# Patient Record
Sex: FEMALE | Race: WHITE | Employment: OTHER | URBAN - METROPOLITAN AREA
[De-identification: names, ages, dates, MRNs, and addresses within clinical notes are randomized per-mention and may not be internally consistent; named-entity substitution may affect disease eponyms.]

---

## 2017-04-24 ENCOUNTER — HOSPITAL ENCOUNTER (EMERGENCY)
Facility: CLINIC | Age: 72
Discharge: HOME | End: 2017-04-24
Attending: PHYSICIAN ASSISTANT | Admitting: PHYSICIAN ASSISTANT
Payer: MEDICARE

## 2017-04-24 VITALS
OXYGEN SATURATION: 95 % | TEMPERATURE: 97.8 F | RESPIRATION RATE: 20 BRPM | DIASTOLIC BLOOD PRESSURE: 82 MMHG | HEART RATE: 71 BPM | SYSTOLIC BLOOD PRESSURE: 134 MMHG

## 2017-04-24 DIAGNOSIS — J01.90 ACUTE SINUSITIS WITH SYMPTOMS > 10 DAYS: ICD-10-CM

## 2017-04-24 PROCEDURE — 99213 OFFICE O/P EST LOW 20 MIN: CPT

## 2017-04-24 PROCEDURE — 99213 OFFICE O/P EST LOW 20 MIN: CPT | Performed by: PHYSICIAN ASSISTANT

## 2017-04-24 RX ORDER — FLUTICASONE PROPIONATE 50 MCG
1-2 SPRAY, SUSPENSION (ML) NASAL DAILY PRN
Qty: 1 BOTTLE | Refills: 0 | Status: SHIPPED | OUTPATIENT
Start: 2017-04-24 | End: 2017-11-16

## 2017-04-24 NOTE — ED AVS SNAPSHOT
[facility] Emergency Department    [address] [facility] [facility] [address]    Phone:  [phone]    Fax:  [phone]                                       Seema Barnes   MRN: [id number]    Department:  [facility] Emergency Department   Date of Visit:  4/24/2017           Patient Information     Date Of Birth          1945        Your diagnoses for this visit were:     Acute sinusitis with symptoms > 10 days        You were seen by Yue Messina PA-C.      Follow-up Information     Please follow up.    Why:  As needed, If symptoms worsen        Discharge Instructions       Use Medication as directed    Patient informed to rest, warm compresses over sinuses as needed, stay hydrated, cool humidifier, salt water gargles, and nasal saline sprays as needed.  Lots of rest and fluids.  Tylenol or ibuprofen as needed.  Nasal saline sprays as needed    Return if any worsening symptoms or if not improving.    Go to Emergency Room if sx worsen or change, worst headache of life, visual changes, confusion, fevers > 104F occur.     Patient voiced understanding of instructions given.       24 Hour Appointment Hotline       To make an appointment at any [facility], call [phone] ([phone]). If you don't have a family doctor or clinic, we will help you find one. [facility] clinics are conveniently located to serve the needs of you and your family.             Review of your medicines      START taking        Dose / Directions Last dose taken    amoxicillin-clavulanate 875-125 MG per tablet   Commonly known as:  AUGMENTIN   Dose:  1 tablet   Quantity:  20 tablet        Take 1 tablet by mouth 2 times daily for 10 days   Refills:  0        fluticasone 50 MCG/ACT spray   Commonly known as:  FLONASE   Dose:  1-2 spray   Quantity:  1 Bottle        Spray 1-2 sprays into both nostrils daily as needed for rhinitis or allergies   Refills:  0          Our records show that you are taking the  medicines listed below. If these are incorrect, please call your family doctor or clinic.        Dose / Directions Last dose taken    atenolol 50 MG tablet   Commonly known as:  TENORMIN   Dose:  50 mg   Quantity:  90 tablet        Take 1 tablet (50 mg) by mouth daily   Refills:  4        EPINEPHrine 0.3 MG/0.3ML injection   Commonly known as:  EPIPEN   Dose:  0.3 mg   Quantity:  1 each        Inject 0.3 mLs (0.3 mg) into the muscle once as needed for anaphylaxis   Refills:  PRN        simvastatin 20 MG tablet   Commonly known as:  ZOCOR   Dose:  20 mg   Quantity:  90 tablet        Take 1 tablet (20 mg) by mouth At Bedtime at bedtime.   Refills:  4                Prescriptions were sent or printed at these locations (2 Prescriptions)                   [facility] [address] [place] [address]   [address] [facility] [address]    Telephone:  [phone]   Fax:  [phone]   Hours:                  E-Prescribed (2 of 2)         amoxicillin-clavulanate (AUGMENTIN) 875-125 MG per tablet               fluticasone (FLONASE) 50 MCG/ACT spray                Orders Needing Specimen Collection     None      Pending Results     No orders found from 4/22/2017 to 4/25/2017.            Pending Culture Results     No orders found from 4/22/2017 to 4/25/2017.            Test Results From Your Hospital Stay               Thank you for choosing [facility]       Thank you for choosing [facility] for your care. Our goal is always to provide you with excellent care. Hearing back from our patients is one way we can continue to improve our services. Please take a few minutes to complete the written survey that you may receive in the mail after you visit with us. Thank you!        [employer] Information     [employer]mi gives you secure access to your electronic health record. If you see a primary care provider, you can also send messages to your care team and make appointments. If you have questions, please call your  primary care clinic.  If you do not have a primary care provider, please call [phone] and they will assist you.        Care EveryWhere ID     This is your Care EveryWhere ID. This could be used by other organizations to access your [place] medical records  [id number]        After Visit Summary       This is your record. Keep this with you and show to your community pharmacist(s) and doctor(s) at your next visit.

## 2017-04-24 NOTE — ED AVS SNAPSHOT
[facility] Emergency Department    [address] [facility] [address]    Phone:  [phone]    Fax:  [phone]                                       Seema Barnes   MRN: [id number]    Department:  [facility] Emergency Department   Date of Visit:  4/24/2017           After Visit Summary Signature Page     I have received my discharge instructions, and my questions have been answered. I have discussed any challenges I see with this plan with the nurse or doctor.    ..........................................................................................................................................  Patient/Patient Representative Signature      ..........................................................................................................................................  Patient Representative Print Name and Relationship to Patient    ..................................................               ................................................  Date                                            Time    ..........................................................................................................................................  Reviewed by Signature/Title    ...................................................              ..............................................  Date                                                            Time

## 2017-04-24 NOTE — ED PROVIDER NOTES
History   No chief complaint on file.    HPI  Seema Barnes is a 72 year old female who   OBJECTIVE:                                                      ENT Symptoms             Symptoms: cc Present Absent Comment   Fever/Chills   x    Fatigue   x    Muscle Aches   x    Eye Irritation  x  Slightly dry eyes    Sneezing  x  occasionally   Nasal Dany/Drg  x  Right maxillary sinus   Sinus Pressure/Pain  x  Right maxillary sinus   Loss of smell   x    Dental pain   x    Sore Throat   x    Swollen Glands   x    Ear Pain/Fullness   x    Cough   x    Wheeze   x    Chest Pain   x    Shortness of breath   x    Rash   x    Other   x  Slight headache on right side over the frontal sinus.   Patient has been dealing with URI symptoms for the past 3 weeks and over the past few days     Symptom duration:  3 weeks.    Symptom severity:  mild   Treatments tried:  over the counter decongestants.    Contacts:  none         Problem list, Medication list, Allergies, and Medical/Social/Surgical histories reviewed in Psychiatric and updated as appropriate.    Review of Systems     All normal unless stated above     Physical Exam   BP: 134/82  Pulse: 71  Temp: 97.8  F (36.6  C)  Resp: 20  SpO2: 95 %  Physical Exam     Constitutional: healthy, alert and no distress   Cardiovascular:  RRR. No murmurs, clicks gallops or rub  Respiratory: Lungs clear to auscultation bilaterally. No rales, rhonchi, wheezing appreciated. No accessory muscle use or retractions.   Neck: Neck supple. No adenopathy. Thyroid symmetric, normal size,  ENT: bilateral TM normal without fluid or infection, neck without nodes, throat normal without erythema or exudate, right maxillary sinus tender and post nasal drip noted  SKIN: no suspicious lesions or rashes    No results found for this or any previous visit (from the past 24 hour(s)).        ED Course     ED Course     Procedures            Critical Care time:  none               Labs Ordered and Resulted from Time of ED  Arrival Up to the Time of Departure from the ED - No data to display    Assessments & Plan (with Medical Decision Making)     I have reviewed the nursing notes.    I have reviewed the findings, diagnosis, plan and need for follow up with the patient.    Discharge Medication List as of 4/24/2017 12:38 PM      START taking these medications    Details   amoxicillin-clavulanate (AUGMENTIN) 875-125 MG per tablet Take 1 tablet by mouth 2 times daily for 10 days, Disp-20 tablet, R-0, E-Prescribe      fluticasone (FLONASE) 50 MCG/ACT spray Spray 1-2 sprays into both nostrils daily as needed for rhinitis or allergies, Disp-1 Bottle, R-0, E-Prescribe             Final diagnoses:   Acute sinusitis with symptoms > 10 days       4/24/2017   [facility] EMERGENCY DEPARTMENT     Yue Messina PA-C  04/24/17 1313

## 2017-09-26 ENCOUNTER — RADIANT APPOINTMENT (OUTPATIENT)
Dept: GENERAL RADIOLOGY | Facility: CLINIC | Age: 72
End: 2017-09-26
Attending: FAMILY MEDICINE
Payer: COMMERCIAL

## 2017-09-26 ENCOUNTER — OFFICE VISIT (OUTPATIENT)
Dept: ORTHOPEDICS | Facility: CLINIC | Age: 72
End: 2017-09-26
Payer: COMMERCIAL

## 2017-09-26 VITALS
BODY MASS INDEX: 25.76 KG/M2 | WEIGHT: 170 LBS | HEIGHT: 68 IN | DIASTOLIC BLOOD PRESSURE: 76 MMHG | SYSTOLIC BLOOD PRESSURE: 113 MMHG

## 2017-09-26 DIAGNOSIS — M70.61 TROCHANTERIC BURSITIS OF RIGHT HIP: ICD-10-CM

## 2017-09-26 DIAGNOSIS — M25.551 RIGHT HIP PAIN: Primary | ICD-10-CM

## 2017-09-26 DIAGNOSIS — M25.551 RIGHT HIP PAIN: ICD-10-CM

## 2017-09-26 PROCEDURE — 73502 X-RAY EXAM HIP UNI 2-3 VIEWS: CPT

## 2017-09-26 PROCEDURE — 99203 OFFICE O/P NEW LOW 30 MIN: CPT | Performed by: FAMILY MEDICINE

## 2017-09-26 NOTE — NURSING NOTE
"Chief Complaint   Patient presents with     Musculoskeletal Problem     right lateral hip pain > 2 months       Initial /76  Ht 5' 7.5\" (1.715 m)  Wt 170 lb (77.1 kg)  BMI 26.23 kg/m2 Estimated body mass index is 26.23 kg/(m^2) as calculated from the following:    Height as of this encounter: 5' 7.5\" (1.715 m).    Weight as of this encounter: 170 lb (77.1 kg).  Medication Reconciliation: complete     Edward Cotter ATC  "

## 2017-09-26 NOTE — PROGRESS NOTES
"Seema Barnes  :  1945  DOS: 2017  MRN: [id number]    Sports Medicine Clinic Visit    PCP: Cande Lao    Seema Barnes is a 72 year old female who is seen as a self referral presenting with right lateral hip pain.    Injury: Gradual onset of right hip pain over the last ~ 2 months after doing increased amount of stairs while on vacation.  Tried resting from walking for ~ 2 weeks with moderate relief - pain returned after resuming walking regime.  Pain located over right lateral hip, nonradiating.  Reports intermittent radiating, pain to lateral thigh.  Additional Features:  Positive: weakness.  Symptoms are better with Rest.  Symptoms are worse with: prolonged walking, going from sit to stand, lying on right side.  Other evaluation and/or treatments so far consists of: Ibuprofen and Rest.  Recent imaging completed: No recent imaging completed.  Prior History of related problems: none    Social History: retired    Review of Systems  Musculoskeletal: as above  Remainder of review of systems is negative including constitutional, CV, pulmonary, GI, Skin and Neurologic except as noted in HPI or medical history.    Past Medical History:   Diagnosis Date     MEDICAL HISTORY OF -     migraines - none since      Pure hypercholesterolemia      Unspecified essential hypertension      Past Surgical History:   Procedure Laterality Date     HC REMOVAL OF TONSILS,<9 Y/O      Tonsils <10y.o.     SURGICAL HISTORY OF -       tubal ligation       Objective  /76  Ht 5' 7.5\" (1.715 m)  Wt 170 lb (77.1 kg)  BMI 26.23 kg/m2    General: healthy, alert and in no distress    HEENT: no scleral icterus or conjunctival erythema   Skin: no suspicious lesions or rash. No jaundice.   CV: regular rhythm by palpation, 2+ distal pulses, no pedal edema    Resp: normal respiratory effort without conversational dyspnea   Psych: normal mood and affect    Gait: nonantalgic, appropriate coordination and balance "   Neuro: normal light touch sensory exam of the extremities. Motor strength as noted below       Right hip exam    Inspection:        no edema or ecchymosis in hip area    ROM:       Full active and passive ROM       painful adduction and ER, mild and localized over lateral hip    Strength:        flexion 5/5       extension 5/5       abduction 5/5       adduction 4/5 due to pain    Tender:        greater trochanter       SI joint but chronic, known and unrelated    Non Tender:        remainder of hip area       illiac crest       ASIS       pubis    Sensation:        grossly intact in hip and thigh    Skin:       well perfused       capillary refill brisk    Special Tests:        neg (-) JOSE ENRIQUE       neg (-) FADIR       neg (-) scour       positive (+) Elle    Radiology  XR images independently visualized and reviewed with patient today in clinic  No significant hip or lumbar degenerative change appreciated, joint spaces well-maintained, no acute findings, will follow final radiology read    Assessment:  1. Right hip pain    2. Trochanteric bursitis of right hip        Plan:  Discussed the assessment with the patient.  Follow up: 6-8 weeks prn  Signs of gluteal tendinitis with mild bursitis/ITB sx  HEP and PT options reviewed  Order can be placed anytime for PT if needed/desired  Low impact activity options and strategies reviewed  HEP provided  No significant hip joint or lumbar signs on exam  Use of short 1-2 wk course of NSAIDs reviewed, dosing and precautions reviewed if utilized  We discussed modified progressive pain-free activity as tolerated  Home handouts provided and supportive care reviewed  All questions were answered today  Contact us with additional questions or concerns  Signs and sx of concern reviewed      Oswaldo Holley DO, CAQ  Primary Care Sports Medicine  [facility]         Disclaimer: This note consists of symbols derived from keyboarding, dictation and/or voice  recognition software. As a result, there may be errors in the script that have gone undetected. Please consider this when interpreting information found in this chart.

## 2017-09-26 NOTE — MR AVS SNAPSHOT
"              After Visit Summary   9/26/2017    Seema Barnes    MRN: [id number]           Patient Information     Date Of Birth          1945        Visit Information        Provider Department      9/26/2017 8:40 AM Oswaldo Holley,  [facility]        Today's Diagnoses     Right hip pain    -  1    Trochanteric bursitis of right hip           Follow-ups after your visit        Who to contact     If you have questions or need follow up information about today's clinic visit or your schedule please contact [facility] directly at [phone].  Normal or non-critical lab and imaging results will be communicated to you by MyChart, letter or phone within 4 business days after the clinic has received the results. If you do not hear from us within 7 days, please contact the clinic through Everypostt or phone. If you have a critical or abnormal lab result, we will notify you by phone as soon as possible.  Submit refill requests through [facility] or call your pharmacy and they will forward the refill request to us. Please allow 3 business days for your refill to be completed.          Additional Information About Your Visit        MyChart Information     [employer] gives you secure access to your electronic health record. If you see a primary care provider, you can also send messages to your care team and make appointments. If you have questions, please call your primary care clinic.  If you do not have a primary care provider, please call [phone] and they will assist you.        Care EveryWhere ID     This is your Care EveryWhere ID. This could be used by other organizations to access your [facility] medical records  [id number]        Your Vitals Were     Height BMI (Body Mass Index)                5' 7.5\" (1.715 m) 26.23 kg/m2           Blood Pressure from Last 3 Encounters:   09/26/17 113/76   04/24/17 134/82   10/18/16 116/89    Weight " from Last 3 Encounters:   09/26/17 170 lb (77.1 kg)   10/18/16 180 lb (81.6 kg)   11/20/15 172 lb (78 kg)               Primary Care Provider Office Phone # Fax #    Cande Mylesvladimir Lao [phone] [address] [facility] [address]        Equal Access to Services     DANIELE LEVI : Hadii aad ku hadasho Soomaali, waaxda luqadaha, qaybta kaalmada adeegyada, waxay idiin hayaan adeeg khlowsh larichardson ah. So [facility] [phone].    ATENCIÓN: Si habla español, tiene a mckeon disposición servicios gratuitos de asistencia lingüística. Fernandoame al [phone].    We comply with applicable federal civil rights laws and [place] laws. We do not discriminate on the basis of race, color, national origin, age, disability sex, sexual orientation or gender identity.            Thank you!     Thank you for choosing [facility]  for your care. Our goal is always to provide you with excellent care. Hearing back from our patients is one way we can continue to improve our services. Please take a few minutes to complete the written survey that you may receive in the mail after your visit with us. Thank you!             Your Updated Medication List - Protect others around you: Learn how to safely use, store and throw away your medicines at www.disposemymeds.org.          This list is accurate as of: 9/26/17  9:16 AM.  Always use your most recent med list.                   Brand Name Dispense Instructions for use Diagnosis    atenolol 50 MG tablet    TENORMIN    90 tablet    Take 1 tablet (50 mg) by mouth daily    Hypertension goal BP (blood pressure) < 130/80       EPINEPHrine 0.3 MG/0.3ML injection 2-pack    EPIPEN    1 each    Inject 0.3 mLs (0.3 mg) into the muscle once as needed for anaphylaxis    Bee sting allergy       fluticasone 50 MCG/ACT spray    FLONASE    1 Bottle    Spray 1-2 sprays into both nostrils daily as needed for rhinitis or allergies        simvastatin 20 MG tablet     ZOCOR    90 tablet    Take 1 tablet (20 mg) by mouth At Bedtime at bedtime.    Hyperlipidemia LDL goal <100

## 2017-11-16 ENCOUNTER — OFFICE VISIT (OUTPATIENT)
Dept: FAMILY MEDICINE | Facility: CLINIC | Age: 72
End: 2017-11-16
Payer: COMMERCIAL

## 2017-11-16 VITALS
WEIGHT: 177 LBS | SYSTOLIC BLOOD PRESSURE: 126 MMHG | TEMPERATURE: 97.6 F | DIASTOLIC BLOOD PRESSURE: 73 MMHG | BODY MASS INDEX: 26.83 KG/M2 | HEIGHT: 68 IN | HEART RATE: 59 BPM

## 2017-11-16 DIAGNOSIS — E78.5 HYPERLIPIDEMIA LDL GOAL <100: ICD-10-CM

## 2017-11-16 DIAGNOSIS — M70.61 TROCHANTERIC BURSITIS OF RIGHT HIP: ICD-10-CM

## 2017-11-16 DIAGNOSIS — Z91.030 BEE STING ALLERGY: ICD-10-CM

## 2017-11-16 DIAGNOSIS — Z82.49 FAMILY HISTORY OF ISCHEMIC HEART DISEASE: Primary | ICD-10-CM

## 2017-11-16 DIAGNOSIS — Z00.00 MEDICARE ANNUAL WELLNESS VISIT, SUBSEQUENT: ICD-10-CM

## 2017-11-16 DIAGNOSIS — I10 HYPERTENSION GOAL BP (BLOOD PRESSURE) < 130/80: ICD-10-CM

## 2017-11-16 LAB
ALBUMIN SERPL-MCNC: 3.9 G/DL (ref 3.4–5)
ALP SERPL-CCNC: 79 U/L (ref 40–150)
ALT SERPL W P-5'-P-CCNC: 25 U/L (ref 0–50)
ANION GAP SERPL CALCULATED.3IONS-SCNC: 7 MMOL/L (ref 3–14)
AST SERPL W P-5'-P-CCNC: 19 U/L (ref 0–45)
BILIRUB SERPL-MCNC: 0.5 MG/DL (ref 0.2–1.3)
BUN SERPL-MCNC: 20 MG/DL (ref 7–30)
CALCIUM SERPL-MCNC: 9.6 MG/DL (ref 8.5–10.1)
CHLORIDE SERPL-SCNC: 105 MMOL/L (ref 94–109)
CHOLEST SERPL-MCNC: 216 MG/DL
CO2 SERPL-SCNC: 27 MMOL/L (ref 20–32)
CREAT SERPL-MCNC: 0.98 MG/DL (ref 0.52–1.04)
GFR SERPL CREATININE-BSD FRML MDRD: 55 ML/MIN/1.7M2
GLUCOSE SERPL-MCNC: 83 MG/DL (ref 70–99)
HDLC SERPL-MCNC: 50 MG/DL
LDLC SERPL CALC-MCNC: 133 MG/DL
NONHDLC SERPL-MCNC: 166 MG/DL
POTASSIUM SERPL-SCNC: 4.2 MMOL/L (ref 3.4–5.3)
PROT SERPL-MCNC: 7.6 G/DL (ref 6.8–8.8)
SODIUM SERPL-SCNC: 139 MMOL/L (ref 133–144)
TRIGL SERPL-MCNC: 167 MG/DL

## 2017-11-16 PROCEDURE — 80061 LIPID PANEL: CPT | Performed by: NURSE PRACTITIONER

## 2017-11-16 PROCEDURE — 36415 COLL VENOUS BLD VENIPUNCTURE: CPT | Performed by: NURSE PRACTITIONER

## 2017-11-16 PROCEDURE — 99397 PER PM REEVAL EST PAT 65+ YR: CPT | Performed by: NURSE PRACTITIONER

## 2017-11-16 PROCEDURE — 80053 COMPREHEN METABOLIC PANEL: CPT | Performed by: NURSE PRACTITIONER

## 2017-11-16 RX ORDER — SIMVASTATIN 20 MG
20 TABLET ORAL AT BEDTIME
Qty: 90 TABLET | Refills: 4 | Status: SHIPPED | OUTPATIENT
Start: 2017-11-16 | End: 2019-07-19

## 2017-11-16 RX ORDER — EPINEPHRINE 0.3 MG/.3ML
0.3 INJECTION SUBCUTANEOUS
Qty: 0.6 ML | Refills: 1 | Status: SHIPPED | OUTPATIENT
Start: 2017-11-16 | End: 2019-07-19

## 2017-11-16 RX ORDER — ATENOLOL 50 MG/1
50 TABLET ORAL DAILY
Qty: 90 TABLET | Refills: 4 | Status: CANCELLED | OUTPATIENT
Start: 2017-11-16

## 2017-11-16 RX ORDER — METOPROLOL SUCCINATE 25 MG/1
25 TABLET, EXTENDED RELEASE ORAL DAILY
Qty: 90 TABLET | Refills: 4 | Status: SHIPPED | OUTPATIENT
Start: 2017-11-16 | End: 2019-07-19

## 2017-11-16 NOTE — NURSING NOTE
"Initial /73  Pulse 59  Temp 97.6  F (36.4  C) (Tympanic)  Ht 5' 7.5\" (1.715 m)  Wt 177 lb (80.3 kg)  BMI 27.31 kg/m2 Estimated body mass index is 27.31 kg/(m^2) as calculated from the following:    Height as of this encounter: 5' 7.5\" (1.715 m).    Weight as of this encounter: 177 lb (80.3 kg). .    Amirah Valerio CMA ([facility])  "

## 2017-11-16 NOTE — MR AVS SNAPSHOT
After Visit Summary   11/16/2017    Seema Barnes    MRN: [id number]           Patient Information     Date Of Birth          1945        Visit Information        Provider Department      11/16/2017 8:20 AM Cande Lao NP [facility]        Today's Diagnoses     Family history of ischemic heart disease    -  1    Medicare annual wellness visit, subsequent        Hypertension goal BP (blood pressure) < 130/80        Hyperlipidemia LDL goal <100        Bee sting allergy          Care Instructions      Preventive Health Recommendations    Female Ages 65 +    Yearly exam:     See your health care provider every year in order to  o Review health changes.   o Discuss preventive care.    o Review your medicines if your doctor has prescribed any.      You no longer need a yearly Pap test unless you've had an abnormal Pap test in the past 10 years. If you have vaginal symptoms, such as bleeding or discharge, be sure to talk with your provider about a Pap test.      Every 1 to 2 years, have a mammogram.  If you are over 69, talk with your health care provider about whether or not you want to continue having screening mammograms.      Every 10 years, have a colonoscopy. Or, have a yearly FIT test (stool test). These exams will check for colon cancer.       Have a cholesterol test every 5 years, or more often if your doctor advises it.       Have a diabetes test (fasting glucose) every three years. If you are at risk for diabetes, you should have this test more often.       At age 65, have a bone density scan (DEXA) to check for osteoporosis (brittle bone disease).    Shots:    Get a flu shot each year.    Get a tetanus shot every 10 years.    Talk to your doctor about your pneumonia vaccines. There are now two you should receive - Pneumovax (PPSV 23) and Prevnar (PCV 13).    Talk to your doctor about the shingles vaccine.    Talk to your doctor about the hepatitis B  vaccine.    Nutrition:     Eat at least 5 servings of fruits and vegetables each day.      Eat whole-grain bread, whole-wheat pasta and brown rice instead of white grains and rice.      Talk to your provider about Calcium and Vitamin D.     Lifestyle    Exercise at least 150 minutes a week (30 minutes a day, 5 days a week). This will help you control your weight and prevent disease.      Limit alcohol to one drink per day.      No smoking.       Wear sunscreen to prevent skin cancer.       See your dentist twice a year for an exam and cleaning.      See your eye doctor every 1 to 2 years to screen for conditions such as glaucoma, macular degeneration and cataracts.    RAJINDER Campos            Follow-ups after your visit        Your next 10 appointments already scheduled     Dec 11, 2017  1:15 PM CST   [facility] with Abdoulaye Ho MD   [facility]    [address] [address]   [phone]           This appointment is used for a first time dermatology visit only to determine the best course of treatment.  There is not a guarantee the provider will be able to perform any procedures on the first visit.                Who to contact     If you have questions or need follow up information about today's clinic visit or your schedule please contact [facility] directly at [phone].  Normal or non-critical lab and imaging results will be communicated to you by DoorDashhart, letter or phone within 4 business days after the clinic has received the results. If you do not hear from us within 7 days, please contact the clinic through DoorDashhart or phone. If you have a critical or abnormal lab result, we will notify you by phone as soon as possible.  Submit refill requests through RedVision System or call your pharmacy and they will forward the refill request to us. Please allow 3 business days for your refill to be completed.        "   Additional Information About Your Visit        Flexenclosurehart Information     Mingxieku gives you secure access to your electronic health record. If you see a primary care provider, you can also send messages to your care team and make appointments. If you have questions, please call your primary care clinic.  If you do not have a primary care provider, please call [phone] and they will assist you.        Care EveryWhere ID     This is your Care EveryWhere ID. This could be used by other organizations to access your [place] medical records  [id number]        Your Vitals Were     Pulse Temperature Height BMI (Body Mass Index)          59 97.6  F (36.4  C) (Tympanic) 5' 7.5\" (1.715 m) 27.31 kg/m2         Blood Pressure from Last 3 Encounters:   11/16/17 126/73   09/26/17 113/76   04/24/17 134/82    Weight from Last 3 Encounters:   11/16/17 177 lb (80.3 kg)   09/26/17 170 lb (77.1 kg)   10/18/16 180 lb (81.6 kg)              We Performed the Following     Comprehensive metabolic panel     Lipid panel reflex to direct LDL Fasting          Today's Medication Changes          These changes are accurate as of: 11/16/17  9:22 AM.  If you have any questions, ask your nurse or doctor.               Start taking these medicines.        Dose/Directions    metoprolol 25 MG 24 hr tablet   Commonly known as:  TOPROL-XL   Used for:  Hypertension goal BP (blood pressure) < 130/80   Started by:  Cande Lao NP        Dose:  25 mg   Take 1 tablet (25 mg) by mouth daily   Quantity:  90 tablet   Refills:  4         Stop taking these medicines if you haven't already. Please contact your care team if you have questions.     atenolol 50 MG tablet   Commonly known as:  TENORMIN   Stopped by:  Cande Lao NP                Where to get your medicines      These medications were sent to [facility] - [place], [place] - [address]   [address] , [place] [address]     " Phone:  [phone]     EPINEPHrine 0.3 MG/0.3ML injection 2-pack    metoprolol 25 MG 24 hr tablet    simvastatin 20 MG tablet                Primary Care Provider Office Phone # Fax #    Cande Lao GELA [phone] [address] [address]        Equal Access to Services     [facility] Center[facility] : Hadii aad ku hadasho Soomaali, waaxda luqadaha, qaybta kaalmada adeegyada, waxay idiin hayaan adeeg khlowsh larichardson . So [facility] [phone].    ATENCIÓN: Si habla español, tiene a mckeon disposición servicios gratuitos de asistencia lingüística. Bob al [phone].    We comply with applicable federal civil rights laws and [place] laws. We do not discriminate on the basis of race, color, national origin, age, disability, sex, sexual orientation, or gender identity.            Thank you!     Thank you for choosing [facility]  for your care. Our goal is always to provide you with excellent care. Hearing back from our patients is one way we can continue to improve our services. Please take a few minutes to complete the written survey that you may receive in the mail after your visit with us. Thank you!             Your Updated Medication List - Protect others around you: Learn how to safely use, store and throw away your medicines at www.disposemymeds.org.          This list is accurate as of: 11/16/17  9:22 AM.  Always use your most recent med list.                   Brand Name Dispense Instructions for use Diagnosis    EPINEPHrine 0.3 MG/0.3ML injection 2-pack    EPIPEN    0.6 mL    Inject 0.3 mLs (0.3 mg) into the muscle once as needed for anaphylaxis    Bee sting allergy       metoprolol 25 MG 24 hr tablet    TOPROL-XL    90 tablet    Take 1 tablet (25 mg) by mouth daily    Hypertension goal BP (blood pressure) < 130/80       simvastatin 20 MG tablet    ZOCOR    90 tablet    Take 1 tablet (20 mg) by mouth At Bedtime at bedtime.    Hyperlipidemia LDL goal <100

## 2017-11-16 NOTE — PROGRESS NOTES
SUBJECTIVE:   Seema Barnes is a 72 year old female who presents for Preventive Visit.  Are you in the first 12 months of your Medicare Part B coverage?  No    Healthy Habits:    Do you get at least three servings of calcium containing foods daily (dairy, green leafy vegetables, etc.)? yes    Amount of exercise or daily activities, outside of work: 5 day(s) per week    Problems taking medications regularly No    Medication side effects: No    Have you had an eye exam in the past two years? yes    Do you see a dentist twice per year? yes    Do you have sleep apnea, excessive snoring or daytime drowsiness?no    COGNITIVE SCREEN  1) Repeat 3 items (Banana, Sunrise, Chair)    2) Clock draw: NORMAL  3) 3 item recall: Recalls 3 objects  Results: 3 items recalled: COGNITIVE IMPAIRMENT LESS LIKELY    Mini-CogTM Copyright S [employer]ava[employer]. Licensed by the author for use in [facility] [employer]; reprinted with permission ([email or web address]). All rights reserved.      Musculoskeletal problem/pain      Duration: 6 months    Description  Location: right hip pain - bursitis    Intensity:  mild, 2/10    Accompanying signs and symptoms: none    History  Previous similar problem: no   Previous evaluation:  x-ray and orthopedic evaluation    Precipitating or alleviating factors:  Trauma or overuse: no   Aggravating factors include: sitting    Therapies tried and outcome: rest/inactivity and ice        Reviewed and updated as needed this visit by clinical staff  Tobacco  Allergies  Meds  Med Hx  Surg Hx  Fam Hx  Soc Hx        Reviewed and updated as needed this visit by Provider  Allergies  Meds        Social History   Substance Use Topics     Smoking status: Never Smoker     Smokeless tobacco: Never Used     Alcohol use No      Comment: One glass wine every 2-3 months       The patient does not drink >3 drinks per day nor >7 drinks per week.     Today's PHQ-2 Score:   PHQ-2 ( 1999 Pfizer) 11/16/2017 11/20/2015   Q1: Little  interest or pleasure in doing things 0 0   Q2: Feeling down, depressed or hopeless 0 0   PHQ-2 Score 0 0   Q1: Little interest or pleasure in doing things - -   Q2: Feeling down, depressed or hopeless - -   PHQ-2 Score - -     Do you feel safe in your environment - Yes    Do you have a Health Care Directive?: No: Advance care planning reviewed with patient; information given to patient to review.      Current providers sharing in care for this patient include: Patient Care Team:  Cande Lao, NP as PCP - General (Nurse Practitioner - Family)      Hearing impairment: No    Ability to successfully perform activities of daily living: Yes, no assistance needed     Fall risk:  Fallen 2 or more times in the past year?: No  Any fall with injury in the past year?: No      Home safety:  none identified  click delete button to remove this line now    The following health maintenance items are reviewed in Epic and correct as of today:  Health Maintenance   Topic Date Due     MICROALBUMIN Q1 YEAR  02/18/1946     ADVANCE DIRECTIVE PLANNING Q5 YRS  02/18/1998     MAMMO SCREEN Q2 YR (SYSTEM ASSIGNED)  12/05/2013     FALL RISK ASSESSMENT  06/18/2015     PNEUMOCOCCAL (2 of 2 - PPSV23) 11/18/2016     INFLUENZA VACCINE (SYSTEM ASSIGNED)  09/01/2017     COLON CANCER SCREEN (SYSTEM ASSIGNED)  11/08/2017     TETANUS IMMUNIZATION (SYSTEM ASSIGNED)  12/10/2017     LIPID SCREEN Q5 YR FEMALE (SYSTEM ASSIGNED)  11/20/2020     DEXA SCAN SCREENING (SYSTEM ASSIGNED)  Completed     Labs reviewed in EPIC  BP Readings from Last 3 Encounters:   11/16/17 126/73   09/26/17 113/76   04/24/17 134/82    Wt Readings from Last 3 Encounters:   11/16/17 177 lb (80.3 kg)   09/26/17 170 lb (77.1 kg)   10/18/16 180 lb (81.6 kg)                  Patient Active Problem List   Diagnosis     Hypertension goal BP (blood pressure) < 130/80     Hyperlipidemia LDL goal <130     Family history of ischemic heart disease     Past Surgical History:   Procedure  Laterality Date     HC REMOVAL OF TONSILS,<9 Y/O      Tonsils <10y.o.     SURGICAL HISTORY OF -       tubal ligation       Social History   Substance Use Topics     Smoking status: Never Smoker     Smokeless tobacco: Never Used     Alcohol use No      Comment: One glass wine every 2-3 months     Family History   Problem Relation Age of Onset     C.A.D. Mother 50     MI-58     Hypertension Mother      C.A.D. Maternal Aunt      C.A.D. Maternal Uncle      C.A.D. Sister      angioplasty and stint-age 59     Hypertension Sister      Hypertension Brother      DIABETES No family hx of      CEREBROVASCULAR DISEASE No family hx of      Breast Cancer No family hx of      Cancer - colorectal No family hx of          Current Outpatient Prescriptions   Medication Sig Dispense Refill     atenolol (TENORMIN) 50 MG tablet Take 1 tablet (50 mg) by mouth daily 90 tablet 4     simvastatin (ZOCOR) 20 MG tablet Take 1 tablet (20 mg) by mouth At Bedtime at bedtime. 90 tablet 4     EPINEPHrine (EPIPEN) 0.3 MG/0.3ML injection Inject 0.3 mLs (0.3 mg) into the muscle once as needed for anaphylaxis 1 each PRN     Allergies   Allergen Reactions     No Known Drug Allergies      Recent Labs   Lab Test  11/20/15   1046  06/18/14   1108  12/19/12   0804   12/11/09   1508   LDL  115*  133*  131*   < >  108   HDL  49*  43*  43*   < >  52   TRIG  184*  153*  204*   < >  135   ALT  36  21  27   < >  16   CR  0.90  0.97  0.89   < >  0.95   GFRESTIMATED  62  57*  63   < >  59*   GFRESTBLACK  75  69  76   < >  71   POTASSIUM  4.4  5.1  4.7   < >  4.3   TSH   --    --    --    --   1.69    < > = values in this interval not displayed.              Pneumonia Vaccine:Adults age 65+ who received their first dose of Pneumovax (PPSV23) prior to age 65 years: Should be given PCV 13 > 1 year after their most recent PPSV23 AND should be given a another dose of PPSV23 > 5 years after their most recent dose of PPSV23  Mammogram Screening: Patient over age 75, has  "elected to stop mammography screening.    ROS:  Constitutional, HEENT, cardiovascular, pulmonary, GI, , musculoskeletal, neuro, skin, endocrine and psych systems are negative, except as otherwise noted.      OBJECTIVE:   /73  Pulse 59  Temp 97.6  F (36.4  C) (Tympanic)  Ht 5' 7.5\" (1.715 m)  Wt 177 lb (80.3 kg)  BMI 27.31 kg/m2 Estimated body mass index is 27.31 kg/(m^2) as calculated from the following:    Height as of this encounter: 5' 7.5\" (1.715 m).    Weight as of this encounter: 177 lb (80.3 kg).  EXAM:   GENERAL: healthy, alert and no distress  EYES: Eyes grossly normal to inspection, PERRL and conjunctivae and sclerae normal  HENT: ear canals and TM's normal, nose and mouth without ulcers or lesions  NECK: no adenopathy, no asymmetry, masses, or scars and thyroid normal to palpation  RESP: lungs clear to auscultation - no rales, rhonchi or wheezes  CV: regular rate and rhythm, normal S1 S2, no S3 or S4, no murmur, click or rub, no peripheral edema and peripheral pulses strong  BREAST: Declined per patient.  ABDOMEN: soft, nontender, no hepatosplenomegaly, no masses and bowel sounds normal  MS: no gross musculoskeletal defects noted, no edema  SKIN: no suspicious lesions or rashes  NEURO: Normal strength and tone, mentation intact and speech normal  PSYCH: mentation appears normal, affect normal/bright    ASSESSMENT / PLAN:   1. Medicare annual wellness visit, subsequent       2. Hypertension goal BP (blood pressure) < 130/80  Controlled.  Changed from Atenolol due to shortage of medication.    - metoprolol (TOPROL-XL) 25 MG 24 hr tablet; Take 1 tablet (25 mg) by mouth daily  Dispense: 90 tablet; Refill: 4  - Comprehensive metabolic panel    3. Hyperlipidemia LDL goal <100     - simvastatin (ZOCOR) 20 MG tablet; Take 1 tablet (20 mg) by mouth At Bedtime at bedtime.  Dispense: 90 tablet; Refill: 4  - Lipid panel reflex to direct LDL Fasting    4. Bee sting allergy     - EPINEPHrine (EPIPEN) 0.3 " "MG/0.3ML injection 2-pack; Inject 0.3 mLs (0.3 mg) into the muscle once as needed for anaphylaxis  Dispense: 0.6 mL; Refill: 1    5. Family history of ischemic heart disease       6. Trochanteric bursitis of right hip  Given instructions.  RICE  Follow up if not improving    Discussed preventative care - mammogram and colonoscopy - declined screening for both - discussed risks of not screening - patient stated understanding of this.      End of Life Planning:  Patient currently has an advanced directive: Yes.  Practitioner is supportive of decision.    COUNSELING:  Reviewed preventive health counseling, as reflected in patient instructions       Regular exercise       Healthy diet/nutrition       Vision screening       Colon cancer screening       Advanced Planning         Estimated body mass index is 27.31 kg/(m^2) as calculated from the following:    Height as of this encounter: 5' 7.5\" (1.715 m).    Weight as of this encounter: 177 lb (80.3 kg).     reports that she has never smoked. She has never used smokeless tobacco.        Appropriate preventive services were discussed with this patient, including applicable screening as appropriate for cardiovascular disease, diabetes, osteopenia/osteoporosis, and glaucoma.  As appropriate for age/gender, discussed screening for colorectal cancer, prostate cancer, breast cancer, and cervical cancer. Checklist reviewing preventive services available has been given to the patient.    Reviewed patients plan of care and provided an AVS. The Intermediate Care Plan ( asthma action plan, low back pain action plan, and migraine action plan) for Seema meets the Care Plan requirement. This Care Plan has been established and reviewed with the Patient.    Counseling Resources:  ATP IV Guidelines  Pooled Cohorts Equation Calculator  Breast Cancer Risk Calculator  FRAX Risk Assessment  ICSI Preventive Guidelines  Dietary Guidelines for Americans, 2010  USDA's MyPlate  ASA " Prophylaxis  Lung CA Screening    Cande Lao, NP  [facility]

## 2017-11-16 NOTE — PATIENT INSTRUCTIONS

## 2017-12-11 ENCOUNTER — OFFICE VISIT (OUTPATIENT)
Dept: DERMATOLOGY | Facility: CLINIC | Age: 72
End: 2017-12-11
Payer: COMMERCIAL

## 2017-12-11 VITALS — DIASTOLIC BLOOD PRESSURE: 74 MMHG | OXYGEN SATURATION: 98 % | SYSTOLIC BLOOD PRESSURE: 132 MMHG | HEART RATE: 63 BPM

## 2017-12-11 DIAGNOSIS — L82.1 SK (SEBORRHEIC KERATOSIS): ICD-10-CM

## 2017-12-11 DIAGNOSIS — L81.4 LENTIGO: ICD-10-CM

## 2017-12-11 DIAGNOSIS — D18.00 ANGIOMA: Primary | ICD-10-CM

## 2017-12-11 PROCEDURE — 99203 OFFICE O/P NEW LOW 30 MIN: CPT | Performed by: DERMATOLOGY

## 2017-12-11 NOTE — LETTER
12/11/2017         RE: Seema Barnes  [address] [address] [address]        Dear Colleague,    Thank you for referring your patient, Seema Barnes, to the [facility]. Please see a copy of my visit note below.    Seema Barnes is a 72 year old year old female patient here today for brown spot son back.   .  Patient states this has been present for years.  Patient reports the following symptoms:  none .  Patient reports the following previous treatments none.  Patient reports the following modifying factors none.  Associated symptoms: none.  Patient has no other skin complaints today.  Remainder of the HPI, Meds, PMH, Allergies, FH, and SH was reviewed in chart.    Pertinent Hx:   No hx of skin cancer  Past Medical History:   Diagnosis Date     MEDICAL HISTORY OF -     migraines - none since 1995     Pure hypercholesterolemia      Unspecified essential hypertension        Past Surgical History:   Procedure Laterality Date     HC REMOVAL OF TONSILS,<9 Y/O      Tonsils <10y.o.     SURGICAL HISTORY OF -       tubal ligation        Family History   Problem Relation Age of Onset     C.A.D. Mother 50     MI-58     Hypertension Mother      C.A.D. Maternal Aunt      C.A.D. Maternal Uncle      C.A.D. Sister      angioplasty and stint-age 59     Hypertension Sister      Hypertension Brother      DIABETES No family hx of      CEREBROVASCULAR DISEASE No family hx of      Breast Cancer No family hx of      Cancer - colorectal No family hx of        Social History     Social History     Marital status:      Spouse name: N/A     Number of children: N/A     Years of education: N/A     Occupational History     Not on file.     Social History Main Topics     Smoking status: Never Smoker     Smokeless tobacco: Never Used     Alcohol use No      Comment: One glass wine every 2-3 months     Drug use: No     Sexual activity: No      Comment:      Other Topics Concern     Parent/Sibling W/  Cabg, Mi Or Angioplasty Before 65f 55m? Yes     Social History Narrative    Dairy/d 3 servings/d. plus a calcium supplement    Caffeine 2-3 servings/d    Exercise 6 x week    Sunscreen used - Not as often as she should    Seatbelts used - Yes    Working smoke/CO detectors in the home - Yes    Guns stored in the home - No    Self Breast Exams - Yes,occ    Self Testicular Exam - NA    Eye Exam up to date - Yes    Dental Exam up to date - Yes    Pap Smear up to date - No    Mammogram up to date - Had appt today    PSA up to date - NA    Dexa Scan up to date - Yes-Sept 2006    Flex Sig / Colonoscopy up to date -Yes-Nov 2007,repeat in 10yrs per pt    Immunizations up to date - yes-td 2007    Abuse: Current or Past(Physical, Sexual or Emotional)- No    Do you feel safe in your environment - Yes       Outpatient Encounter Prescriptions as of 12/11/2017   Medication Sig Dispense Refill     simvastatin (ZOCOR) 20 MG tablet Take 1 tablet (20 mg) by mouth At Bedtime at bedtime. 90 tablet 4     EPINEPHrine (EPIPEN) 0.3 MG/0.3ML injection 2-pack Inject 0.3 mLs (0.3 mg) into the muscle once as needed for anaphylaxis 0.6 mL 1     metoprolol (TOPROL-XL) 25 MG 24 hr tablet Take 1 tablet (25 mg) by mouth daily 90 tablet 4     No facility-administered encounter medications on file as of 12/11/2017.              Review Of Systems  Skin: As above  Eyes: negative  Ears/Nose/Throat: negative  Respiratory: No shortness of breath, dyspnea on exertion, cough, or hemoptysis  Cardiovascular: negative  Gastrointestinal: negative  Genitourinary: negative  Musculoskeletal: negative  Neurologic: negative  Psychiatric: negative  Hematologic/Lymphatic/Immunologic: negative  Endocrine: negative      O:   NAD, WDWN, Alert & Oriented, Mood & Affect wnl, Vitals stable   Here today alone   /74  Pulse 63  SpO2 98%   General appearance normal   Vitals stable   Alert, oriented and in no acute distress      Following lymph nodes palpated: Occipital,  Cervical, Supraclavicular no lad   Stuck on papules and brown macules on trunk and ext    Red papule son trunk         The remainder of the full exam was unremarkable; the following areas were examined:  conjunctiva/lids, oral mucosa, neck, peripheral vascular system, abdomen, lymph nodes, digits/nails, eccrine and apocrine glands, scalp/hair, face, neck, chest, abdomen, buttocks, back, RUE, LUE, RLE, LLE       Eyes: Conjunctivae/lids:Normal     ENT: Lips, buccal mucosa, tongue: normal    MSK:Normal    Cardiovascular: peripheral edema none    Pulm: Breathing Normal    Lymph Nodes: No Head and Neck Lymphadenopathy     Neuro/Psych: Orientation:Normal; Mood/Affect:Normal      A/P:  1. Seborrheic keratosis, lentigo, angioma  BENIGN LESIONS DISCUSSED WITH PATIENT:  I discussed the specifics of tumor, prognosis, and genetics of benign lesions.  I explained that treatment of these lesions would be purely cosmetic and not medically neccessary.  I discussed with patient different removal options including excision, cautery and /or laser.      Nature and genetics of benign skin lesions dicussed with patient.  Signs and Symptoms of skin cancer discussed with patient.  Patient encouraged to perform monthly skin exams.  UV precautions reviewed with patient.  Skin care regimen reviewed with patient: Eliminate harsh soaps, i.e. Dial, zest, irsih spring; Mild soaps such as Cetaphil or Dove sensitive skin, avoid hot or cold showers, aggressive use of emollients including vanicream, cetaphil or cerave discussed with patient.    Risks of non-melanoma skin cancer discussed with patient   Return to clinic 12 months      Again, thank you for allowing me to participate in the care of your patient.        Sincerely,        Abdoulaye Ho MD

## 2017-12-11 NOTE — PATIENT INSTRUCTIONS
Proper skin care from Dr. Ho- [facility]     Eliminate harsh soaps, i.e. Dial, Zest, Maria Luz Spring;   Use mild soaps such as Cetaphil or Dove Sensitive Skin   Avoid hot or cold showers   After showering, lightly dry off.    Aggressive use of a moisturizer (including Vanicream, Cetaphil, Aquaphor or Cerave)   We recommend using a tub that needs to be scooped out, not a pump. This has more of an oil base. It will hold moisture in your skin much better than a water base moisturizer. The ones recommended are non- pore clogging.       If you have any questions call [phone] and follow the prompts to Dr. Ho's office.

## 2017-12-11 NOTE — NURSING NOTE
Chief Complaint   Patient presents with     Skin Check       Vitals:    12/11/17 1310   BP: 132/74   Pulse: 63   SpO2: 98%     Wt Readings from Last 1 Encounters:   11/16/17 80.3 kg (177 lb)     Donna Barney LPN.................12/11/2017

## 2017-12-11 NOTE — PROGRESS NOTES
Seema Barnes is a 72 year old year old female patient here today for brown spot son back.   .  Patient states this has been present for years.  Patient reports the following symptoms:  none .  Patient reports the following previous treatments none.  Patient reports the following modifying factors none.  Associated symptoms: none.  Patient has no other skin complaints today.  Remainder of the HPI, Meds, PMH, Allergies, FH, and SH was reviewed in chart.    Pertinent Hx:   No hx of skin cancer  Past Medical History:   Diagnosis Date     MEDICAL HISTORY OF -     migraines - none since 1995     Pure hypercholesterolemia      Unspecified essential hypertension        Past Surgical History:   Procedure Laterality Date     HC REMOVAL OF TONSILS,<9 Y/O      Tonsils <10y.o.     SURGICAL HISTORY OF -       tubal ligation        Family History   Problem Relation Age of Onset     C.A.D. Mother 50     MI-58     Hypertension Mother      C.A.NYDIA. Maternal Aunt      C.A.NYDIA. Maternal Uncle      C.A.D. Sister      angioplasty and stint-age 61     Hypertension Sister      Hypertension Brother      DIABETES No family hx of      CEREBROVASCULAR DISEASE No family hx of      Breast Cancer No family hx of      Cancer - colorectal No family hx of        Social History     Social History     Marital status:      Spouse name: N/A     Number of children: N/A     Years of education: N/A     Occupational History     Not on file.     Social History Main Topics     Smoking status: Never Smoker     Smokeless tobacco: Never Used     Alcohol use No      Comment: One glass wine every 2-3 months     Drug use: No     Sexual activity: No      Comment:      Other Topics Concern     Parent/Sibling W/ Cabg, Mi Or Angioplasty Before 65f 55m? Yes     Social History Narrative    Dairy/d 3 servings/d. plus a calcium supplement    Caffeine 2-3 servings/d    Exercise 6 x week    Sunscreen used - Not as often as she should    Seatbelts used - Yes     Working smoke/CO detectors in the home - Yes    Guns stored in the home - No    Self Breast Exams - Yes,occ    Self Testicular Exam - NA    Eye Exam up to date - Yes    Dental Exam up to date - Yes    Pap Smear up to date - No    Mammogram up to date - Had appt today    PSA up to date - NA    Dexa Scan up to date - Yes-Sept 2006    Flex Sig / Colonoscopy up to date -Yes-Nov 2007,repeat in 10yrs per pt    Immunizations up to date - yes-td 2007    Abuse: Current or Past(Physical, Sexual or Emotional)- No    Do you feel safe in your environment - Yes       Outpatient Encounter Prescriptions as of 12/11/2017   Medication Sig Dispense Refill     simvastatin (ZOCOR) 20 MG tablet Take 1 tablet (20 mg) by mouth At Bedtime at bedtime. 90 tablet 4     EPINEPHrine (EPIPEN) 0.3 MG/0.3ML injection 2-pack Inject 0.3 mLs (0.3 mg) into the muscle once as needed for anaphylaxis 0.6 mL 1     metoprolol (TOPROL-XL) 25 MG 24 hr tablet Take 1 tablet (25 mg) by mouth daily 90 tablet 4     No facility-administered encounter medications on file as of 12/11/2017.              Review Of Systems  Skin: As above  Eyes: negative  Ears/Nose/Throat: negative  Respiratory: No shortness of breath, dyspnea on exertion, cough, or hemoptysis  Cardiovascular: negative  Gastrointestinal: negative  Genitourinary: negative  Musculoskeletal: negative  Neurologic: negative  Psychiatric: negative  Hematologic/Lymphatic/Immunologic: negative  Endocrine: negative      O:   NAD, WDWN, Alert & Oriented, Mood & Affect wnl, Vitals stable   Here today alone   /74  Pulse 63  SpO2 98%   General appearance normal   Vitals stable   Alert, oriented and in no acute distress      Following lymph nodes palpated: Occipital, Cervical, Supraclavicular no lad   Stuck on papules and brown macules on trunk and ext    Red papule son trunk         The remainder of the full exam was unremarkable; the following areas were examined:  conjunctiva/lids, oral mucosa, neck,  peripheral vascular system, abdomen, lymph nodes, digits/nails, eccrine and apocrine glands, scalp/hair, face, neck, chest, abdomen, buttocks, back, RUE, LUE, RLE, LLE       Eyes: Conjunctivae/lids:Normal     ENT: Lips, buccal mucosa, tongue: normal    MSK:Normal    Cardiovascular: peripheral edema none    Pulm: Breathing Normal    Lymph Nodes: No Head and Neck Lymphadenopathy     Neuro/Psych: Orientation:Normal; Mood/Affect:Normal      A/P:  1. Seborrheic keratosis, lentigo, angioma  BENIGN LESIONS DISCUSSED WITH PATIENT:  I discussed the specifics of tumor, prognosis, and genetics of benign lesions.  I explained that treatment of these lesions would be purely cosmetic and not medically neccessary.  I discussed with patient different removal options including excision, cautery and /or laser.      Nature and genetics of benign skin lesions dicussed with patient.  Signs and Symptoms of skin cancer discussed with patient.  Patient encouraged to perform monthly skin exams.  UV precautions reviewed with patient.  Skin care regimen reviewed with patient: Eliminate harsh soaps, i.e. Dial, zest, irsih spring; Mild soaps such as Cetaphil or Dove sensitive skin, avoid hot or cold showers, aggressive use of emollients including vanicream, cetaphil or cerave discussed with patient.    Risks of non-melanoma skin cancer discussed with patient   Return to clinic 12 months

## 2017-12-11 NOTE — MR AVS SNAPSHOT
After Visit Summary   12/11/2017    Seema Barnes    MRN: [id number]           Patient Information     Date Of Birth          1945        Visit Information        Provider Department      12/11/2017 1:15 PM Abdoulaye Ho MD [facility]        Today's Diagnoses     Angioma    -  1    Lentigo        SK (seborrheic keratosis)          Care Instructions    Proper skin care from Dr. Ho- [facility]     Eliminate harsh soaps, i.e. Dial, Zest, [place] Spring;   Use mild soaps such as Cetaphil or Dove Sensitive Skin   Avoid hot or cold showers   After showering, lightly dry off.    Aggressive use of a moisturizer (including Vanicream, Cetaphil, Aquaphor or Cerave)   We recommend using a tub that needs to be scooped out, not a pump. This has more of an oil base. It will hold moisture in your skin much better than a water base moisturizer. The ones recommended are non- pore clogging.       If you have any questions call [phone] and follow the prompts to Dr. Ho's office.             Follow-ups after your visit        Who to contact     If you have questions or need follow up information about today's clinic visit or your schedule please contact [facility] directly at [phone].  Normal or non-critical lab and imaging results will be communicated to you by MyChart, letter or phone within 4 business days after the clinic has received the results. If you do not hear from us within 7 days, please contact the clinic through [employer] or phone. If you have a critical or abnormal lab result, we will notify you by phone as soon as possible.  Submit refill requests through [employer] or call your pharmacy and they will forward the refill request to us. Please allow 3 business days for your refill to be completed.          Additional Information About Your Visit        [employer] Information     [employer] gives you secure access to your electronic health record.  If you see a primary care provider, you can also send messages to your care team and make appointments. If you have questions, please call your primary care clinic.  If you do not have a primary care provider, please call [phone] and they will assist you.        Care EveryWhere ID     This is your Care EveryWhere ID. This could be used by other organizations to access your [facility] medical records  [id number]        Your Vitals Were     Pulse Pulse Oximetry                63 98%           Blood Pressure from Last 3 Encounters:   12/11/17 132/74   11/16/17 126/73   09/26/17 113/76    Weight from Last 3 Encounters:   11/16/17 80.3 kg (177 lb)   09/26/17 77.1 kg (170 lb)   10/18/16 81.6 kg (180 lb)              Today, you had the following     No orders found for display       Primary Care Provider Office Phone # Fax #    Cande Mylesvladimir Lao [phone] [address] [facility] [address]        Equal Access to Services     GURMEET LEVI : Hadii aad ku hadasho Soomaali, waaxda luqadaha, qaybta kaalmada adeegyada, waxay idiin hayamber hutson . So [facility] [phone].    ATENCIÓN: Si habla español, tiene a mckeon disposición servicios gratuitos de asistencia lingüística. Llame al [phone].    We comply with applicable federal civil rights laws and [place] laws. We do not discriminate on the basis of race, color, national origin, age, disability, sex, sexual orientation, or gender identity.            Thank you!     Thank you for choosing [facility]  for your care. Our goal is always to provide you with excellent care. Hearing back from our patients is one way we can continue to improve our services. Please take a few minutes to complete the written survey that you may receive in the mail after your visit with us. Thank you!             Your Updated Medication List - Protect others around you: Learn how to safely use, store and throw away your medicines at  www.disposemymeds.org.          This list is accurate as of: 12/11/17  1:28 PM.  Always use your most recent med list.                   Brand Name Dispense Instructions for use Diagnosis    EPINEPHrine 0.3 MG/0.3ML injection 2-pack    EPIPEN    0.6 mL    Inject 0.3 mLs (0.3 mg) into the muscle once as needed for anaphylaxis    Bee sting allergy       metoprolol 25 MG 24 hr tablet    TOPROL-XL    90 tablet    Take 1 tablet (25 mg) by mouth daily    Hypertension goal BP (blood pressure) < 130/80       simvastatin 20 MG tablet    ZOCOR    90 tablet    Take 1 tablet (20 mg) by mouth At Bedtime at bedtime.    Hyperlipidemia LDL goal <100

## 2018-01-04 ENCOUNTER — TELEPHONE (OUTPATIENT)
Dept: FAMILY MEDICINE | Facility: CLINIC | Age: 73
End: 2018-01-04

## 2018-01-04 NOTE — TELEPHONE ENCOUNTER
Pt is due for a Mammogram and a colon cancer screening. Called and spoke to Pt. She states that she will not be getting anymore mammograms and is unsure about continuing with colon cancer screenings. If she decides to pursue a colonoscopy or a FIT Test when she gets back from [place],  She will contact the clinic.  Jordyn Carranza CMA        Panel Management Review      Patient has the following on her problem list:     Hypertension   Last three blood pressure readings:  BP Readings from Last 3 Encounters:   12/11/17 132/74   11/16/17 126/73   09/26/17 113/76     Blood pressure: Passed    HTN Guidelines:  Age 18-59 BP range:  Less than 140/90  Age 60-85 with Diabetes:  Less than 140/90  Age 60-85 without Diabetes:  less than 150/90        Composite cancer screening  Chart review shows that this patient is due/due soon for the following Mammogram and Colonoscopy  Summary:    Patient is due/failing the following:   COLONOSCOPY and MAMMOGRAM    Action needed:   Pt has declined a colon cancer screening and a mammogram.    Type of outreach:    Phone, spoke to patient.  She is declining.    Questions for provider review:    None                                                                                                                                    Jordyn Carranza CMA       Chart routed to NONE .

## 2018-09-19 ENCOUNTER — TRANSFERRED RECORDS (OUTPATIENT)
Dept: HEALTH INFORMATION MANAGEMENT | Facility: CLINIC | Age: 73
End: 2018-09-19

## 2018-09-19 LAB — HEMOCCULT STL QL IA: NEGATIVE

## 2019-07-16 ASSESSMENT — ENCOUNTER SYMPTOMS
ARTHRALGIAS: 0
CONSTIPATION: 0
DIARRHEA: 0
DIZZINESS: 0
FREQUENCY: 0
FEVER: 0
EYE PAIN: 0
PALPITATIONS: 0
BREAST MASS: 0
HEMATOCHEZIA: 0
MYALGIAS: 0
NERVOUS/ANXIOUS: 0
CHILLS: 0
DYSURIA: 0
ABDOMINAL PAIN: 0
HEARTBURN: 0
HEMATURIA: 0
COUGH: 0
SHORTNESS OF BREATH: 0
WEAKNESS: 0
JOINT SWELLING: 0
NAUSEA: 0
SORE THROAT: 0
HEADACHES: 0
PARESTHESIAS: 0

## 2019-07-16 ASSESSMENT — ACTIVITIES OF DAILY LIVING (ADL): CURRENT_FUNCTION: NO ASSISTANCE NEEDED

## 2019-07-19 ENCOUNTER — OFFICE VISIT (OUTPATIENT)
Dept: FAMILY MEDICINE | Facility: CLINIC | Age: 74
End: 2019-07-19
Payer: COMMERCIAL

## 2019-07-19 VITALS
OXYGEN SATURATION: 98 % | SYSTOLIC BLOOD PRESSURE: 104 MMHG | WEIGHT: 175.2 LBS | DIASTOLIC BLOOD PRESSURE: 70 MMHG | HEART RATE: 65 BPM | RESPIRATION RATE: 16 BRPM | HEIGHT: 67 IN | BODY MASS INDEX: 27.5 KG/M2 | TEMPERATURE: 98.2 F

## 2019-07-19 DIAGNOSIS — I10 HYPERTENSION GOAL BP (BLOOD PRESSURE) < 130/80: ICD-10-CM

## 2019-07-19 DIAGNOSIS — Z00.00 ENCOUNTER FOR MEDICARE ANNUAL WELLNESS EXAM: Primary | ICD-10-CM

## 2019-07-19 DIAGNOSIS — Z23 NEED FOR VACCINATION: ICD-10-CM

## 2019-07-19 DIAGNOSIS — N18.30 CKD (CHRONIC KIDNEY DISEASE) STAGE 3, GFR 30-59 ML/MIN (H): ICD-10-CM

## 2019-07-19 DIAGNOSIS — E78.5 HYPERLIPIDEMIA LDL GOAL <100: ICD-10-CM

## 2019-07-19 DIAGNOSIS — Z91.030 BEE STING ALLERGY: ICD-10-CM

## 2019-07-19 LAB
ANION GAP SERPL CALCULATED.3IONS-SCNC: 7 MMOL/L (ref 3–14)
BUN SERPL-MCNC: 18 MG/DL (ref 7–30)
CALCIUM SERPL-MCNC: 9.6 MG/DL (ref 8.5–10.1)
CHLORIDE SERPL-SCNC: 107 MMOL/L (ref 94–109)
CHOLEST SERPL-MCNC: 216 MG/DL
CO2 SERPL-SCNC: 27 MMOL/L (ref 20–32)
CREAT SERPL-MCNC: 0.89 MG/DL (ref 0.52–1.04)
CREAT UR-MCNC: 49 MG/DL
GFR SERPL CREATININE-BSD FRML MDRD: 63 ML/MIN/{1.73_M2}
GLUCOSE SERPL-MCNC: 92 MG/DL (ref 70–99)
HDLC SERPL-MCNC: 51 MG/DL
LDLC SERPL CALC-MCNC: 119 MG/DL
MICROALBUMIN UR-MCNC: 21 MG/L
MICROALBUMIN/CREAT UR: 43.47 MG/G CR (ref 0–25)
NONHDLC SERPL-MCNC: 165 MG/DL
POTASSIUM SERPL-SCNC: 4.2 MMOL/L (ref 3.4–5.3)
SODIUM SERPL-SCNC: 141 MMOL/L (ref 133–144)
TRIGL SERPL-MCNC: 228 MG/DL

## 2019-07-19 PROCEDURE — 90714 TD VACC NO PRESV 7 YRS+ IM: CPT | Performed by: FAMILY MEDICINE

## 2019-07-19 PROCEDURE — 82043 UR ALBUMIN QUANTITATIVE: CPT | Performed by: FAMILY MEDICINE

## 2019-07-19 PROCEDURE — G0438 PPPS, INITIAL VISIT: HCPCS | Performed by: FAMILY MEDICINE

## 2019-07-19 PROCEDURE — 80061 LIPID PANEL: CPT | Performed by: FAMILY MEDICINE

## 2019-07-19 PROCEDURE — 80048 BASIC METABOLIC PNL TOTAL CA: CPT | Performed by: FAMILY MEDICINE

## 2019-07-19 PROCEDURE — 90471 IMMUNIZATION ADMIN: CPT | Performed by: FAMILY MEDICINE

## 2019-07-19 PROCEDURE — 36415 COLL VENOUS BLD VENIPUNCTURE: CPT | Performed by: FAMILY MEDICINE

## 2019-07-19 RX ORDER — SIMVASTATIN 20 MG
20 TABLET ORAL AT BEDTIME
Qty: 90 TABLET | Refills: 4 | Status: SHIPPED | OUTPATIENT
Start: 2019-07-19 | End: 2020-09-18

## 2019-07-19 RX ORDER — EPINEPHRINE 0.3 MG/.3ML
0.3 INJECTION SUBCUTANEOUS
Qty: 0.6 ML | Refills: 1 | Status: SHIPPED | OUTPATIENT
Start: 2019-07-19 | End: 2019-07-19

## 2019-07-19 RX ORDER — EPINEPHRINE 0.3 MG/.3ML
0.3 INJECTION SUBCUTANEOUS
Qty: 0.6 ML | Refills: 1 | Status: SHIPPED | OUTPATIENT
Start: 2019-07-19 | End: 2021-09-23

## 2019-07-19 RX ORDER — METOPROLOL SUCCINATE 25 MG/1
25 TABLET, EXTENDED RELEASE ORAL DAILY
Qty: 90 TABLET | Refills: 4 | Status: SHIPPED | OUTPATIENT
Start: 2019-07-19 | End: 2020-09-18

## 2019-07-19 ASSESSMENT — ENCOUNTER SYMPTOMS
DIZZINESS: 0
NERVOUS/ANXIOUS: 0
CONSTIPATION: 0
HEMATOCHEZIA: 0
FEVER: 0
WEAKNESS: 0
HEMATURIA: 0
DIARRHEA: 0
PARESTHESIAS: 0
COUGH: 0
ARTHRALGIAS: 0
SORE THROAT: 0
MYALGIAS: 0
HEADACHES: 0
EYE PAIN: 0
JOINT SWELLING: 0
NAUSEA: 0
SHORTNESS OF BREATH: 0
PALPITATIONS: 0
HEARTBURN: 0
FREQUENCY: 0
CHILLS: 0
ABDOMINAL PAIN: 0
DYSURIA: 0
BREAST MASS: 0

## 2019-07-19 ASSESSMENT — ACTIVITIES OF DAILY LIVING (ADL): CURRENT_FUNCTION: NO ASSISTANCE NEEDED

## 2019-07-19 ASSESSMENT — MIFFLIN-ST. JEOR: SCORE: 1317.33

## 2019-07-19 NOTE — PROGRESS NOTES
"SUBJECTIVE:   Seema Barnes is a 74 year old female who presents for Preventive Visit.      Are you in the first 12 months of your Medicare coverage?  No    Healthy Habits:     In general, how would you rate your overall health?  Excellent    Frequency of exercise:  4-5 days/week    Duration of exercise:  30-45 minutes    Do you usually eat at least 4 servings of fruit and vegetables a day, include whole grains    & fiber and avoid regularly eating high fat or \"junk\" foods?  Yes    Taking medications regularly:  Yes    Medication side effects:  None    Ability to successfully perform activities of daily living:  No assistance needed    Home Safety:  No safety concerns identified    Hearing Impairment:  Difficulty following a conversation in a noisy restaurant or crowded room and no hearing concerns    In the past 6 months, have you been bothered by leaking of urine? Yes    In general, how would you rate your overall mental or emotional health?  Excellent      PHQ-2 Total Score: 0    Additional concerns today:  No    Do you feel safe in your environment? Yes    Do you have a Health Care Directive? No: Advance care planning reviewed with patient; information given to patient to review.      Fall risk  Fallen 2 or more times in the past year?: No  Any fall with injury in the past year?: No    Cognitive Screening   1) Repeat 3 items (Leader, Season, Table)    2) Clock draw: NORMAL  3) 3 item recall: Recalls 3 objects  Results: 3 items recalled: COGNITIVE IMPAIRMENT LESS LIKELY    Mini-CogTM Copyright S Tavares. Licensed by the author for use in [facility]; reprinted with permission (demetria@.[facility]). All rights reserved.      Do you have sleep apnea, excessive snoring or daytime drowsiness?: no    Reviewed and updated as needed this visit by clinical staff  Tobacco  Allergies  Meds         Reviewed and updated as needed this visit by Provider        Social History     Tobacco Use     Smoking status: Never " Smoker     Smokeless tobacco: Never Used   Substance Use Topics     Alcohol use: No     Alcohol/week: 0.0 oz     Comment: One glass wine every 2-3 months         Alcohol Use 7/16/2019   Prescreen: >3 drinks/day or >7 drinks/week? No   Prescreen: >3 drinks/day or >7 drinks/week? -           Hyperlipidemia Follow-Up      Are you having any of the following symptoms? (Select all that apply)  No complaints of shortness of breath, chest pain or pressure.  No increased sweating or nausea with activity.  No left-sided neck or arm pain.  No complaints of pain in calves when walking 1-2 blocks.    Are you regularly taking any medication or supplement to lower your cholesterol?   Yes- simvastatin    Are you having muscle aches or other side effects that you think could be caused by your cholesterol lowering medication?  No      Hypertension Follow-up      Do you check your blood pressure regularly outside of the clinic? No     Are you following a low salt diet? No    Are your blood pressures ever more than 140 on the top number (systolic) OR more   than 90 on the bottom number (diastolic), for example 140/90? No    Current providers sharing in care for this patient include:   Patient Care Team:  Cande Lao NP as PCP - General (Nurse Practitioner - Family)  Cande Lao NP as Assigned PCP    The following health maintenance items are reviewed in Epic and correct as of today:  Health Maintenance   Topic Date Due     MICROALBUMIN  1945     ADVANCE CARE PLANNING  1945     ZOSTER IMMUNIZATION (1 of 2) 02/18/1993     COLONOSCOPY  11/08/2017     DTAP/TDAP/TD IMMUNIZATION (2 - Td) 12/10/2017     MEDICARE ANNUAL WELLNESS VISIT  11/16/2018     FALL RISK ASSESSMENT  11/16/2018     INFLUENZA VACCINE (1) 09/01/2019     LIPID  11/16/2022     DEXA  Completed     PHQ-2  Completed     IPV IMMUNIZATION  Aged Out     MENINGITIS IMMUNIZATION  Aged Out     Lab work is in process      Review of Systems  "  Constitutional: Negative for chills and fever.   HENT: Negative for congestion, ear pain, hearing loss and sore throat.    Eyes: Negative for pain and visual disturbance.   Respiratory: Negative for cough and shortness of breath.    Cardiovascular: Negative for chest pain, palpitations and peripheral edema.   Gastrointestinal: Negative for abdominal pain, constipation, diarrhea, heartburn, hematochezia and nausea.   Breasts:  Negative for breast mass.   Genitourinary: Negative for dysuria, frequency, genital sores, hematuria, pelvic pain, urgency and vaginal discharge.   Musculoskeletal: Negative for arthralgias, joint swelling and myalgias.   Skin: Negative for rash.   Neurological: Negative for dizziness, weakness, headaches and paresthesias.   Psychiatric/Behavioral: Negative for mood changes. The patient is not nervous/anxious.          OBJECTIVE:   /70   Pulse 65   Temp 98.2  F (36.8  C) (Tympanic)   Resp 16   Ht 1.702 m (5' 7\")   Wt 79.5 kg (175 lb 3.2 oz)   SpO2 98%   BMI 27.44 kg/m   Estimated body mass index is 27.44 kg/m  as calculated from the following:    Height as of this encounter: 1.702 m (5' 7\").    Weight as of this encounter: 79.5 kg (175 lb 3.2 oz).  Physical Exam  GENERAL: healthy, alert and no distress  EYES: Eyes grossly normal to inspection, PERRL and conjunctivae and sclerae normal  HENT: ear canals and TM's normal, nose and mouth without ulcers or lesions  NECK: no adenopathy, no asymmetry, masses, or scars and thyroid normal to palpation  RESP: lungs clear to auscultation - no rales, rhonchi or wheezes  BREAST: normal without masses, tenderness or nipple discharge and no palpable axillary masses or adenopathy  CV: regular rate and rhythm, normal S1 S2, no S3 or S4, no murmur, click or rub, no peripheral edema and peripheral pulses strong  ABDOMEN: soft, nontender, no hepatosplenomegaly, no masses and bowel sounds normal  MS: no gross musculoskeletal defects noted, no " "edema  SKIN: scaling lesion on top of head where she was bitten by multiple deer flies  NEURO: Normal strength and tone, mentation intact and speech normal  PSYCH: mentation appears normal, affect normal/bright        ASSESSMENT / PLAN:   1. Encounter for Medicare annual wellness exam       2. Bee sting allergy     - EPINEPHrine (EPIPEN) 0.3 MG/0.3ML injection 2-pack; Inject 0.3 mLs (0.3 mg) into the muscle once as needed for anaphylaxis Provide the cheapest generic option per insurance  Dispense: 0.6 mL; Refill: 1    3. Hypertension goal BP (blood pressure) < 130/80  Well controlled  - metoprolol succinate ER (TOPROL-XL) 25 MG 24 hr tablet; Take 1 tablet (25 mg) by mouth daily  Dispense: 90 tablet; Refill: 4  - Basic metabolic panel    4. Hyperlipidemia LDL goal <100  Check lipids today  - simvastatin (ZOCOR) 20 MG tablet; Take 1 tablet (20 mg) by mouth At Bedtime at bedtime.  Dispense: 90 tablet; Refill: 4  - Lipid panel reflex to direct LDL Non-fasting    5. CKD (chronic kidney disease) stage 3, GFR 30-59 ml/min (H)     - Albumin Random Urine Quantitative with Creat Ratio    6. Need for vaccination     - TD PRSERV FREE >=7 YRS ADS IM [72898]  - 1st  Administration  [[id number]]    End of Life Planning:  Patient currently has an advanced directive: No.  I have verified the patient's ablity to prepare an advanced directive/make health care decisions.  Literature was provided to assist patient in preparing an advanced directive.    COUNSELING:  Reviewed preventive health counseling, as reflected in patient instructions       Regular exercise       Healthy diet/nutrition    Estimated body mass index is 27.44 kg/m  as calculated from the following:    Height as of this encounter: 1.702 m (5' 7\").    Weight as of this encounter: 79.5 kg (175 lb 3.2 oz).         reports that she has never smoked. She has never used smokeless tobacco.      Appropriate preventive services were discussed with this patient, including applicable " screening as appropriate for cardiovascular disease, diabetes, osteopenia/osteoporosis, and glaucoma.  As appropriate for age/gender, discussed screening for colorectal cancer, prostate cancer, breast cancer, and cervical cancer. Checklist reviewing preventive services available has been given to the patient.    Reviewed patients plan of care and provided an AVS. The Basic Care Plan (routine screening as documented in Health Maintenance) for Seema meets the Care Plan requirement. This Care Plan has been established and reviewed with the Patient.    Counseling Resources:  ATP IV Guidelines  Pooled Cohorts Equation Calculator  Breast Cancer Risk Calculator  FRAX Risk Assessment  ICSI Preventive Guidelines  Dietary Guidelines for Americans, 2010  USDA's MyPlate  ASA Prophylaxis  Lung CA Screening    Nat Houston MD  [facility]    Identified Health Risks:

## 2019-07-19 NOTE — NURSING NOTE
Screening Questionnaire for Adult Immunization    Are you sick today?   No   Do you have allergies to medications, food, a vaccine component or latex?   No   Have you ever had a serious reaction after receiving a vaccination?   No   Do you have a long-term health problem with heart disease, lung disease, asthma, kidney disease, metabolic disease (e.g. diabetes), anemia, or other blood disorder?   No   Do you have cancer, leukemia, HIV/AIDS, or any other immune system problem?   No   In the past 3 months, have you taken medications that affect  your immune system, such as prednisone, other steroids, or anticancer drugs; drugs for the treatment of rheumatoid arthritis, Crohn s disease, or psoriasis; or have you had radiation treatments?   No   Have you had a seizure, or a brain or other nervous system problem?   No   During the past year, have you received a transfusion of blood or blood     products, or been given immune (gamma) globulin or antiviral drug?   No   For women: Are you pregnant or is there a chance you could become        pregnant during the next month?   No   Have you received any vaccinations in the past 4 weeks?   No     Immunization questionnaire answers were all negative.        Per orders of Dr. Houston, injection of Td given by Kim Ryan. Patient instructed to remain in clinic for 15 minutes afterwards, and to report any adverse reaction to me immediately.       Screening performed by Kim Ryan on 7/19/2019 at 2:18 PM.

## 2019-07-19 NOTE — PATIENT INSTRUCTIONS
Schedule mammogram [phone]    The new Shingrix is supposed to be more effective at preventing shingles.  Even if you had Zostavax, this is recommended. I encourage people to check with their pharmacy (or ours) and have them run it through to check the cost.  It's often better covered through your pharmacy benefit.  It is a two part vaccine series - one now and one in 2 months.    Tetanus shot given today      Patient Education   Personalized Prevention Plan  You are due for the preventive services outlined below.  Your care team is available to assist you in scheduling these services.  If you have already completed any of these items, please share that information with your care team to update in your medical record.  Health Maintenance Due   Topic Date Due     Kidney Microalbumin Urine Test  1945     Discuss Advance Care Planning  1945     Zoster (Shingles) Vaccine (1 of 2) 02/18/1993     Diptheria Tetanus Pertussis (DTAP/TDAP/TD) Vaccine (2 - Td) 12/10/2017     Annual Wellness Visit  11/16/2018     FALL RISK ASSESSMENT  11/16/2018

## 2019-07-22 NOTE — RESULT ENCOUNTER NOTE
Seema,    All of the labs were normal or acceptable.    Please contact my office if you have questions.    Nat Houston M.D.

## 2019-10-31 ENCOUNTER — ANCILLARY PROCEDURE (OUTPATIENT)
Dept: GENERAL RADIOLOGY | Facility: CLINIC | Age: 74
End: 2019-10-31
Attending: PODIATRIST
Payer: COMMERCIAL

## 2019-10-31 ENCOUNTER — OFFICE VISIT (OUTPATIENT)
Dept: PODIATRY | Facility: CLINIC | Age: 74
End: 2019-10-31
Payer: COMMERCIAL

## 2019-10-31 VITALS
SYSTOLIC BLOOD PRESSURE: 112 MMHG | HEART RATE: 68 BPM | DIASTOLIC BLOOD PRESSURE: 72 MMHG | WEIGHT: 175 LBS | HEIGHT: 67 IN | BODY MASS INDEX: 27.47 KG/M2

## 2019-10-31 DIAGNOSIS — M19.072 PRIMARY OSTEOARTHRITIS OF LEFT FOOT: Primary | ICD-10-CM

## 2019-10-31 PROCEDURE — 99203 OFFICE O/P NEW LOW 30 MIN: CPT | Performed by: PODIATRIST

## 2019-10-31 PROCEDURE — 73630 X-RAY EXAM OF FOOT: CPT | Mod: LT

## 2019-10-31 RX ORDER — INFLUENZA A VIRUS A/VICTORIA/4897/2022 IVR-238 (H1N1) ANTIGEN (FORMALDEHYDE INACTIVATED), INFLUENZA A VIRUS A/CALIFORNIA/122/2022 SAN-022 (H3N2) ANTIGEN (FORMALDEHYDE INACTIVATED), AND INFLUENZA B VIRUS B/MICHIGAN/01/2021 ANTIGEN (FORMALDEHYDE INACTIVATED) 60; 60; 60 UG/.5ML; UG/.5ML; UG/.5ML
INJECTION, SUSPENSION INTRAMUSCULAR
Refills: 0 | COMMUNITY
Start: 2018-11-15 | End: 2021-09-23

## 2019-10-31 RX ORDER — INFLUENZA VACCINE, ADJUVANTED 15; 15; 15 UG/.5ML; UG/.5ML; UG/.5ML
INJECTION, SUSPENSION INTRAMUSCULAR
Refills: 0 | COMMUNITY
Start: 2019-10-25 | End: 2021-09-23

## 2019-10-31 ASSESSMENT — MIFFLIN-ST. JEOR: SCORE: 1316.42

## 2019-10-31 NOTE — PROGRESS NOTES
PATIENT HISTORY:  Seema Barnes is a 74 year old female who presents to clinic for a painful left foot .  The patient describes the pain as sharp aching.  The patient relates the pain level is moderate.  The patient relates pain is located in the arch of the left foot.  The patient relates the pain has been present for the past several months.  The patient relates pain with ambulation.  The patient has tried different shoes with little relief.       REVIEW OF SYSTEMS:  Constitutional, HEENT, cardiovascular, pulmonary, GI, , musculoskeletal, neuro, skin, endocrine and psych systems are negative, except as otherwise noted.     PAST MEDICAL HISTORY:   Past Medical History:   Diagnosis Date     MEDICAL HISTORY OF -     migraines - none since 1995     Pure hypercholesterolemia      Unspecified essential hypertension         PAST SURGICAL HISTORY:   Past Surgical History:   Procedure Laterality Date     HC REMOVAL OF TONSILS,<11 Y/O      Tonsils <10y.o.     SURGICAL HISTORY OF -       tubal ligation        MEDICATIONS:   Current Outpatient Medications:      FLUAD 0.5 ML ZOHAIB, ADM 0.5ML IM UTD, Disp: , Rfl: 0     FLUZONE HIGH-DOSE 0.5 ML injection, PSO, Disp: , Rfl: 0     metoprolol succinate ER (TOPROL-XL) 25 MG 24 hr tablet, Take 1 tablet (25 mg) by mouth daily, Disp: 90 tablet, Rfl: 4     simvastatin (ZOCOR) 20 MG tablet, Take 1 tablet (20 mg) by mouth At Bedtime at bedtime., Disp: 90 tablet, Rfl: 4     EPINEPHrine (EPIPEN) 0.3 MG/0.3ML injection 2-pack, Inject 0.3 mLs (0.3 mg) into the muscle once as needed for anaphylaxis Provide the cheapest generic option per insurance (Patient not taking: Reported on 10/31/2019), Disp: 0.6 mL, Rfl: 1     ALLERGIES:    Allergies   Allergen Reactions     Wasps [Hornets] Anaphylaxis     No Known Drug Allergies         SOCIAL HISTORY:   Social History     Socioeconomic History     Marital status:      Spouse name: Not on file     Number of children: Not on file     Years of  education: Not on file     Highest education level: Not on file   Occupational History     Not on file   Social Needs     Financial resource strain: Not on file     Food insecurity:     Worry: Not on file     Inability: Not on file     Transportation needs:     Medical: Not on file     Non-medical: Not on file   Tobacco Use     Smoking status: Never Smoker     Smokeless tobacco: Never Used   Substance and Sexual Activity     Alcohol use: No     Alcohol/week: 0.0 standard drinks     Comment: One glass wine every 2-3 months     Drug use: No     Sexual activity: Never     Partners: Male     Comment:    Lifestyle     Physical activity:     Days per week: Not on file     Minutes per session: Not on file     Stress: Not on file   Relationships     Social connections:     Talks on phone: Not on file     Gets together: Not on file     Attends Hinduism service: Not on file     Active member of club or organization: Not on file     Attends meetings of clubs or organizations: Not on file     Relationship status: Not on file     Intimate partner violence:     Fear of current or ex partner: Not on file     Emotionally abused: Not on file     Physically abused: Not on file     Forced sexual activity: Not on file   Other Topics Concern     Parent/sibling w/ CABG, MI or angioplasty before 65F 55M? Yes   Social History Narrative    Dairy/d 3 servings/d. plus a calcium supplement    Caffeine 2-3 servings/d    Exercise 6 x week    Sunscreen used - Not as often as she should    Seatbelts used - Yes    Working smoke/CO detectors in the home - Yes    Guns stored in the home - No    Self Breast Exams - Yes,occ    Self Testicular Exam - NA    Eye Exam up to date - Yes    Dental Exam up to date - Yes    Pap Smear up to date - No    Mammogram up to date - Had appt today    PSA up to date - NA    Dexa Scan up to date - Yes-Sept 2006    Flex Sig / Colonoscopy up to date -Yes-Nov 2007,repeat in 10yrs per pt    Immunizations up to date -  "yes-td 2007    Abuse: Current or Past(Physical, Sexual or Emotional)- No    Do you feel safe in your environment - Yes        FAMILY HISTORY:   Family History   Problem Relation Age of Onset     C.A.D. Mother 50        MI-58     Hypertension Mother      AGUSTÍN.A.NYDIA. Maternal Aunt      LUI. Maternal Uncle      AGUSTÍN.ARUFINO. Sister         angioplasty and stint-age 61     Hypertension Sister      Hypertension Brother      Diabetes No family hx of      Cerebrovascular Disease No family hx of      Breast Cancer No family hx of      Cancer - colorectal No family hx of         EXAM:Vitals: /72   Pulse 68   Ht 1.702 m (5' 7\")   Wt 79.4 kg (175 lb)   BMI 27.41 kg/m    BMI= Body mass index is 27.41 kg/m .        General appearance: Patient is alert and fully cooperative with history & exam.  No sign of distress is noted during the visit.     Psychiatric: Affect is pleasant & appropriate.  Patient appears motivated to improve health.     Respiratory: Breathing is regular & unlabored while sitting.     HEENT: Hearing is intact to spoken word.  Speech is clear.  No gross evidence of visual impairment that would impact ambulation.     Dermatologic: Skin is intact to left lower extremities without significant lesions, rash or abrasion.  No paronychia or evidence of soft tissue infection is noted.     Vascular: DP & PT pulses are intact & regular on the left.  No significant edema or varicosities noted.  CFT and skin temperature is normal to the left lower extremities.     Neurologic: Lower extremity sensation is intact to light touch.  No evidence of weakness or contracture in the left lower extremities.  No evidence of neuropathy.     Musculoskeletal: Patient is ambulatory without assistive device or brace.  No gross ankle deformity noted.  No foot or ankle joint effusion is noted.  Noted pain with manipulation of the first metatarsal cuneiform joint on the left.  One notes positive crepitus.  Noted edema over the dorsal arch of " the left foot.    Radiographs were evaluated including AP, lateral and medial oblique views of the left foot reveals degenerative changes to the first and second tarsometatarsal joint.  No cortical erosions or periosteal elevation.  All joint margins appear stable.  There is no apparent fracture or tumor formation noted.  There is no evidence of foreign body.    ASSESSMENT / PLAN:     ICD-10-CM    1. Primary osteoarthritis of left foot M19.072 XR Foot Left G/E 3 Views       I have explained to Seema  about the conditions.  We discussed the nature of the condition as well as the treatment plan and expected length of recovery.  At this time, the patient was instructed on icing, stretching, tissue massage and support.   The patient will return in four weeks for reevaluation if the symptoms do not resolve.      Seema verbalized agreement with and understanding of the rational for the diagnosis and treatment plan.  All questions were answered to best of my ability and the patient's satisfaction. The patient was advised to contact the clinic with any questions that may arise after the clinic visit.      Disclaimer: This note consists of symbols derived from keyboarding, dictation and/or voice recognition software. As a result, there may be errors in the script that have gone undetected. Please consider this when interpreting information found in this chart.       DILIP Robb D.P.M., [email or web address].

## 2019-10-31 NOTE — LETTER
10/31/2019         RE: Seema Barnes  [id number] [facility] [id number]        Dear Colleague,    Thank you for referring your patient, Seema Barnes, to the [facility]. Please see a copy of my visit note below.    PATIENT HISTORY:  Seema Barnes is a 74 year old female who presents to clinic for a painful left foot .  The patient describes the pain as sharp aching.  The patient relates the pain level is moderate.  The patient relates pain is located in the arch of the left foot.  The patient relates the pain has been present for the past several months.  The patient relates pain with ambulation.  The patient has tried different shoes with little relief.       REVIEW OF SYSTEMS:  Constitutional, HEENT, cardiovascular, pulmonary, GI, , musculoskeletal, neuro, skin, endocrine and psych systems are negative, except as otherwise noted.     PAST MEDICAL HISTORY:   Past Medical History:   Diagnosis Date     MEDICAL HISTORY OF -     migraines - none since 1995     Pure hypercholesterolemia      Unspecified essential hypertension         PAST SURGICAL HISTORY:   Past Surgical History:   Procedure Laterality Date     HC REMOVAL OF TONSILS,<9 Y/O      Tonsils <10y.o.     SURGICAL HISTORY OF -       tubal ligation        MEDICATIONS:   Current Outpatient Medications:      FLUAD 0.5 ML ZOHAIB, ADM 0.5ML IM UTD, Disp: , Rfl: 0     FLUZONE HIGH-DOSE 0.5 ML injection, PSO, Disp: , Rfl: 0     metoprolol succinate ER (TOPROL-XL) 25 MG 24 hr tablet, Take 1 tablet (25 mg) by mouth daily, Disp: 90 tablet, Rfl: 4     simvastatin (ZOCOR) 20 MG tablet, Take 1 tablet (20 mg) by mouth At Bedtime at bedtime., Disp: 90 tablet, Rfl: 4     EPINEPHrine (EPIPEN) 0.3 MG/0.3ML injection 2-pack, Inject 0.3 mLs (0.3 mg) into the muscle once as needed for anaphylaxis Provide the cheapest generic option per insurance (Patient not taking: Reported on 10/31/2019), Disp: 0.6 mL, Rfl: 1     ALLERGIES:     Allergies   Allergen Reactions     Wasps [Hornets] Anaphylaxis     No Known Drug Allergies         SOCIAL HISTORY:   Social History     Socioeconomic History     Marital status:      Spouse name: Not on file     Number of children: Not on file     Years of education: Not on file     Highest education level: Not on file   Occupational History     Not on file   Social Needs     Financial resource strain: Not on file     Food insecurity:     Worry: Not on file     Inability: Not on file     Transportation needs:     Medical: Not on file     Non-medical: Not on file   Tobacco Use     Smoking status: Never Smoker     Smokeless tobacco: Never Used   Substance and Sexual Activity     Alcohol use: No     Alcohol/week: 0.0 standard drinks     Comment: One glass wine every 2-3 months     Drug use: No     Sexual activity: Never     Partners: Male     Comment:    Lifestyle     Physical activity:     Days per week: Not on file     Minutes per session: Not on file     Stress: Not on file   Relationships     Social connections:     Talks on phone: Not on file     Gets together: Not on file     Attends Scientology service: Not on file     Active member of club or organization: Not on file     Attends meetings of clubs or organizations: Not on file     Relationship status: Not on file     Intimate partner violence:     Fear of current or ex partner: Not on file     Emotionally abused: Not on file     Physically abused: Not on file     Forced sexual activity: Not on file   Other Topics Concern     Parent/sibling w/ CABG, MI or angioplasty before 65F 55M? Yes   Social History Narrative    Dairy/d 3 servings/d. plus a calcium supplement    Caffeine 2-3 servings/d    Exercise 6 x week    Sunscreen used - Not as often as she should    Seatbelts used - Yes    Working smoke/CO detectors in the home - Yes    Guns stored in the home - No    Self Breast Exams - Yes,occ    Self Testicular Exam - NA    Eye Exam up to date - Yes     "Dental Exam up to date - Yes    Pap Smear up to date - No    Mammogram up to date - Had appt today    PSA up to date - NA    Dexa Scan up to date - Yes-Sept 2006    Flex Sig / Colonoscopy up to date -Yes-Nov 2007,repeat in 10yrs per pt    Immunizations up to date - yes-td 2007    Abuse: Current or Past(Physical, Sexual or Emotional)- No    Do you feel safe in your environment - Yes        FAMILY HISTORY:   Family History   Problem Relation Age of Onset     C.A.D. Mother 50        MI-58     Hypertension Mother      C.A.D. Maternal Aunt      C.A.D. Maternal Uncle      C.A.D. Sister         angioplasty and stint-age 61     Hypertension Sister      Hypertension Brother      Diabetes No family hx of      Cerebrovascular Disease No family hx of      Breast Cancer No family hx of      Cancer - colorectal No family hx of         EXAM:Vitals: /72   Pulse 68   Ht 1.702 m (5' 7\")   Wt 79.4 kg (175 lb)   BMI 27.41 kg/m     BMI= Body mass index is 27.41 kg/m .        General appearance: Patient is alert and fully cooperative with history & exam.  No sign of distress is noted during the visit.     Psychiatric: Affect is pleasant & appropriate.  Patient appears motivated to improve health.     Respiratory: Breathing is regular & unlabored while sitting.     HEENT: Hearing is intact to spoken word.  Speech is clear.  No gross evidence of visual impairment that would impact ambulation.     Dermatologic: Skin is intact to left lower extremities without significant lesions, rash or abrasion.  No paronychia or evidence of soft tissue infection is noted.     Vascular: DP & PT pulses are intact & regular on the left.  No significant edema or varicosities noted.  CFT and skin temperature is normal to the left lower extremities.     Neurologic: Lower extremity sensation is intact to light touch.  No evidence of weakness or contracture in the left lower extremities.  No evidence of neuropathy.     Musculoskeletal: Patient is " ambulatory without assistive device or brace.  No gross ankle deformity noted.  No foot or ankle joint effusion is noted.  Noted pain with manipulation of the first metatarsal cuneiform joint on the left.  One notes positive crepitus.  Noted edema over the dorsal arch of the left foot.    Radiographs were evaluated including AP, lateral and medial oblique views of the left foot reveals degenerative changes to the first and second tarsometatarsal joint.  No cortical erosions or periosteal elevation.  All joint margins appear stable.  There is no apparent fracture or tumor formation noted.  There is no evidence of foreign body.    ASSESSMENT / PLAN:     ICD-10-CM    1. Primary osteoarthritis of left foot M19.072 XR Foot Left G/E 3 Views       I have explained to Seema  about the conditions.  We discussed the nature of the condition as well as the treatment plan and expected length of recovery.  At this time, the patient was instructed on icing, stretching, tissue massage and support.   The patient will return in four weeks for reevaluation if the symptoms do not resolve.      Seema verbalized agreement with and understanding of the rational for the diagnosis and treatment plan.  All questions were answered to best of my ability and the patient's satisfaction. The patient was advised to contact the clinic with any questions that may arise after the clinic visit.      Disclaimer: This note consists of symbols derived from keyboarding, dictation and/or voice recognition software. As a result, there may be errors in the script that have gone undetected. Please consider this when interpreting information found in this chart.       DILIP Robb D.P.M., F.A.C.F.A.S.        Again, thank you for allowing me to participate in the care of your patient.        Sincerely,        Tim Robb DPM

## 2019-10-31 NOTE — NURSING NOTE
"Chief Complaint   Patient presents with     Consult     Arthritis in left foot, \"sharp pain that shoot from top of foot into toes\"       Initial /72   Pulse 68   Ht 1.702 m (5' 7\")   Wt 79.4 kg (175 lb)   BMI 27.41 kg/m   Estimated body mass index is 27.41 kg/m  as calculated from the following:    Height as of this encounter: 1.702 m (5' 7\").    Weight as of this encounter: 79.4 kg (175 lb).  Medications and allergies reviewed.      Veronica STAFFORD MA    "

## 2019-11-05 ENCOUNTER — HEALTH MAINTENANCE LETTER (OUTPATIENT)
Age: 74
End: 2019-11-05

## 2020-11-22 ENCOUNTER — HEALTH MAINTENANCE LETTER (OUTPATIENT)
Age: 75
End: 2020-11-22

## 2021-07-27 ENCOUNTER — OFFICE VISIT (OUTPATIENT)
Dept: DERMATOLOGY | Facility: CLINIC | Age: 76
End: 2021-07-27
Payer: COMMERCIAL

## 2021-07-27 VITALS — HEART RATE: 60 BPM | OXYGEN SATURATION: 97 % | DIASTOLIC BLOOD PRESSURE: 85 MMHG | SYSTOLIC BLOOD PRESSURE: 134 MMHG

## 2021-07-27 DIAGNOSIS — L81.4 LENTIGO: ICD-10-CM

## 2021-07-27 DIAGNOSIS — L82.0 INFLAMED SEBORRHEIC KERATOSIS: Primary | ICD-10-CM

## 2021-07-27 DIAGNOSIS — L82.1 SEBORRHEIC KERATOSIS: ICD-10-CM

## 2021-07-27 DIAGNOSIS — D18.01 CHERRY ANGIOMA: ICD-10-CM

## 2021-07-27 PROCEDURE — 99203 OFFICE O/P NEW LOW 30 MIN: CPT | Mod: 25 | Performed by: PHYSICIAN ASSISTANT

## 2021-07-27 PROCEDURE — 17110 DESTRUCTION B9 LES UP TO 14: CPT | Performed by: PHYSICIAN ASSISTANT

## 2021-07-27 NOTE — PROGRESS NOTES
Seema Barnes is an extremely pleasant 76 year old year old female patient here today for spot on cheek. Present for years, she notes she will pick at spot. No pain or bleeding. Patient has no other skin complaints today.  Remainder of the HPI, Meds, PMH, Allergies, FH, and SH was reviewed in chart.    Pertinent Hx:   No personal history of skin cancer   Past Medical History:   Diagnosis Date     MEDICAL HISTORY OF -     migraines - none since 1995     Pure hypercholesterolemia      Unspecified essential hypertension        Past Surgical History:   Procedure Laterality Date     HC REMOVAL OF TONSILS,<9 Y/O      Tonsils <10y.o.     SURGICAL HISTORY OF -       tubal ligation        Family History   Problem Relation Age of Onset     C.A.D. Mother 50        MI-58     Hypertension Mother      C.A.D. Maternal Aunt      C.A.D. Maternal Uncle      C.A.D. Sister         angioplasty and stint-age 59     Hypertension Sister      Hypertension Brother      Diabetes No family hx of      Cerebrovascular Disease No family hx of      Breast Cancer No family hx of      Cancer - colorectal No family hx of        Social History     Socioeconomic History     Marital status:      Spouse name: Not on file     Number of children: Not on file     Years of education: Not on file     Highest education level: Not on file   Occupational History     Not on file   Tobacco Use     Smoking status: Never Smoker     Smokeless tobacco: Never Used   Substance and Sexual Activity     Alcohol use: No     Alcohol/week: 0.0 standard drinks     Comment: One glass wine every 2-3 months     Drug use: No     Sexual activity: Never     Partners: Male     Comment:    Other Topics Concern     Parent/sibling w/ CABG, MI or angioplasty before 65F 55M? Yes   Social History Narrative    Dairy/d 3 servings/d. plus a calcium supplement    Caffeine 2-3 servings/d    Exercise 6 x week    Sunscreen used - Not as often as she should    Seatbelts used -  Yes    Working smoke/CO detectors in the home - Yes    Guns stored in the home - No    Self Breast Exams - Yes,occ    Self Testicular Exam - NA    Eye Exam up to date - Yes    Dental Exam up to date - Yes    Pap Smear up to date - No    Mammogram up to date - Had appt today    PSA up to date - NA    Dexa Scan up to date - Yes-Sept 2006    Flex Sig / Colonoscopy up to date -Yes-Nov 2007,repeat in 10yrs per pt    Immunizations up to date - yes-td 2007    Abuse: Current or Past(Physical, Sexual or Emotional)- No    Do you feel safe in your environment - Yes     Social Determinants of Health     Financial Resource Strain:      Difficulty of Paying Living Expenses:    Food Insecurity:      Worried About Running Out of Food in the Last Year:      Ran Out of Food in the Last Year:    Transportation Needs:      Lack of Transportation (Medical):      Lack of Transportation (Non-Medical):    Physical Activity:      Days of Exercise per Week:      Minutes of Exercise per Session:    Stress:      Feeling of Stress :    Social Connections:      Frequency of Communication with Friends and Family:      Frequency of Social Gatherings with Friends and Family:      Attends Voodoo Services:      Active Member of Clubs or Organizations:      Attends Club or Organization Meetings:      Marital Status:    Intimate Partner Violence:      Fear of Current or Ex-Partner:      Emotionally Abused:      Physically Abused:      Sexually Abused:        Outpatient Encounter Medications as of 7/27/2021   Medication Sig Dispense Refill     metoprolol succinate ER (TOPROL-XL) 25 MG 24 hr tablet TAKE 1 TABLET DAILY 90 tablet 0     simvastatin (ZOCOR) 20 MG tablet TAKE 1 TABLET AT BEDTIME 90 tablet 0     EPINEPHrine (EPIPEN) 0.3 MG/0.3ML injection 2-pack Inject 0.3 mLs (0.3 mg) into the muscle once as needed for anaphylaxis Provide the cheapest generic option per insurance (Patient not taking: Reported on 10/31/2019) 0.6 mL 1     FLUAD 0.5 ML ZOHAIB  ADM 0.5ML IM UTD (Patient not taking: Reported on 7/27/2021)  0     FLUZONE HIGH-DOSE 0.5 ML injection PSO  0     No facility-administered encounter medications on file as of 7/27/2021.             O:   NAD, WDWN, Alert & Oriented, Mood & Affect wnl, Vitals stable   Here today alone   /85 (BP Location: Right arm, Patient Position: Sitting, Cuff Size: Adult Regular)   Pulse 60   SpO2 97%    General appearance normal   Vitals stable   Alert, oriented and in no acute distress   Brown papule on left cheek   Stuck on papules and brown macules on trunk and ext   Red papules on trunk     The remainder of skin exam is normal       Eyes: Conjunctivae/lids:Normal     ENT: Lips: normal    MSK:Normal    Cardiovascular: peripheral edema none    Pulm: Breathing Normal    Neuro/Psych: Orientation:Alert and Orientedx3 ; Mood/Affect:normal     A/P:  1. Inflamed nevus on left cheek x 1  LN2:  Treated with LN2 for 5s for 1-2 cycles. Warned risks of blistering, pain, pigment change, scarring, and incomplete resolution.  Advised patient to return if lesions do not completely resolve.  Wound care sheet given.  2. Seborrheic keratosis, lentigo, angioma,   BENIGN LESIONS DISCUSSED WITH PATIENT:  I discussed the specifics of tumor, prognosis, and genetics of benign lesions.  I explained that treatment of these lesions would be purely cosmetic and not medically neccessary.  I discussed with patient different removal options including excision, cautery and /or laser.      Nature and genetics of benign skin lesions dicussed with patient.  Signs and Symptoms of skin cancer discussed with patient.  ABCDEs of melanoma reviewed with patient.  Patient encouraged to perform monthly skin exams.  UV precautions reviewed with patient.  Risks of non-melanoma skin cancer discussed with patient   Return to clinic in one year or sooner if needed.

## 2021-07-27 NOTE — LETTER
7/27/2021         RE: Seema Barnes  [address] [facility] [address]        Dear Colleague,    Thank you for referring your patient, Seema Barnes, to the [facility]. Please see a copy of my visit note below.    Seema Barnes is an extremely pleasant 76 year old year old female patient here today for spot on cheek. Present for years, she notes she will pick at spot. No pain or bleeding. Patient has no other skin complaints today.  Remainder of the HPI, Meds, PMH, Allergies, FH, and SH was reviewed in chart.    Pertinent Hx:   No personal history of skin cancer   Past Medical History:   Diagnosis Date     MEDICAL HISTORY OF -     migraines - none since 1995     Pure hypercholesterolemia      Unspecified essential hypertension        Past Surgical History:   Procedure Laterality Date     HC REMOVAL OF TONSILS,<11 Y/O      Tonsils <10y.o.     SURGICAL HISTORY OF -       tubal ligation        Family History   Problem Relation Age of Onset     C.A.D. Mother 50        MI-58     Hypertension Mother      C.A.D. Maternal Aunt      C.A.D. Maternal Uncle      C.A.D. Sister         angioplasty and stint-age 61     Hypertension Sister      Hypertension Brother      Diabetes No family hx of      Cerebrovascular Disease No family hx of      Breast Cancer No family hx of      Cancer - colorectal No family hx of        Social History     Socioeconomic History     Marital status:      Spouse name: Not on file     Number of children: Not on file     Years of education: Not on file     Highest education level: Not on file   Occupational History     Not on file   Tobacco Use     Smoking status: Never Smoker     Smokeless tobacco: Never Used   Substance and Sexual Activity     Alcohol use: No     Alcohol/week: 0.0 standard drinks     Comment: One glass wine every 2-3 months     Drug use: No     Sexual activity: Never     Partners: Male     Comment:    Other Topics Concern      Parent/sibling w/ CABG, MI or angioplasty before 65F 55M? Yes   Social History Narrative    Dairy/d 3 servings/d. plus a calcium supplement    Caffeine 2-3 servings/d    Exercise 6 x week    Sunscreen used - Not as often as she should    Seatbelts used - Yes    Working smoke/CO detectors in the home - Yes    Guns stored in the home - No    Self Breast Exams - Yes,occ    Self Testicular Exam - NA    Eye Exam up to date - Yes    Dental Exam up to date - Yes    Pap Smear up to date - No    Mammogram up to date - Had appt today    PSA up to date - NA    Dexa Scan up to date - Yes-Sept 2006    Flex Sig / Colonoscopy up to date -Yes-Nov 2007,repeat in 10yrs per pt    Immunizations up to date - yes-td 2007    Abuse: Current or Past(Physical, Sexual or Emotional)- No    Do you feel safe in your environment - Yes     Social Determinants of Health     Financial Resource Strain:      Difficulty of Paying Living Expenses:    Food Insecurity:      Worried About Running Out of Food in the Last Year:      Ran Out of Food in the Last Year:    Transportation Needs:      Lack of Transportation (Medical):      Lack of Transportation (Non-Medical):    Physical Activity:      Days of Exercise per Week:      Minutes of Exercise per Session:    Stress:      Feeling of Stress :    Social Connections:      Frequency of Communication with Friends and Family:      Frequency of Social Gatherings with Friends and Family:      Attends Buddhist Services:      Active Member of Clubs or Organizations:      Attends Club or Organization Meetings:      Marital Status:    Intimate Partner Violence:      Fear of Current or Ex-Partner:      Emotionally Abused:      Physically Abused:      Sexually Abused:        Outpatient Encounter Medications as of 7/27/2021   Medication Sig Dispense Refill     metoprolol succinate ER (TOPROL-XL) 25 MG 24 hr tablet TAKE 1 TABLET DAILY 90 tablet 0     simvastatin (ZOCOR) 20 MG tablet TAKE 1 TABLET AT BEDTIME 90  tablet 0     EPINEPHrine (EPIPEN) 0.3 MG/0.3ML injection 2-pack Inject 0.3 mLs (0.3 mg) into the muscle once as needed for anaphylaxis Provide the cheapest generic option per insurance (Patient not taking: Reported on 10/31/2019) 0.6 mL 1     FLUAD 0.5 ML ZOHAIB ADM 0.5ML IM UTD (Patient not taking: Reported on 7/27/2021)  0     FLUZONE HIGH-DOSE 0.5 ML injection PSO  0     No facility-administered encounter medications on file as of 7/27/2021.             O:   NAD, WDWN, Alert & Oriented, Mood & Affect wnl, Vitals stable   Here today alone   /85 (BP Location: Right arm, Patient Position: Sitting, Cuff Size: Adult Regular)   Pulse 60   SpO2 97%    General appearance normal   Vitals stable   Alert, oriented and in no acute distress   Brown papule on left cheek   Stuck on papules and brown macules on trunk and ext   Red papules on trunk     The remainder of skin exam is normal       Eyes: Conjunctivae/lids:Normal     ENT: Lips: normal    MSK:Normal    Cardiovascular: peripheral edema none    Pulm: Breathing Normal    Neuro/Psych: Orientation:Alert and Orientedx3 ; Mood/Affect:normal     A/P:  1. Inflamed nevus on left cheek x 1  LN2:  Treated with LN2 for 5s for 1-2 cycles. Warned risks of blistering, pain, pigment change, scarring, and incomplete resolution.  Advised patient to return if lesions do not completely resolve.  Wound care sheet given.  2. Seborrheic keratosis, lentigo, angioma,   BENIGN LESIONS DISCUSSED WITH PATIENT:  I discussed the specifics of tumor, prognosis, and genetics of benign lesions.  I explained that treatment of these lesions would be purely cosmetic and not medically neccessary.  I discussed with patient different removal options including excision, cautery and /or laser.      Nature and genetics of benign skin lesions dicussed with patient.  Signs and Symptoms of skin cancer discussed with patient.  ABCDEs of melanoma reviewed with patient.  Patient encouraged to perform monthly  skin exams.  UV precautions reviewed with patient.  Risks of non-melanoma skin cancer discussed with patient   Return to clinic in one year or sooner if needed.         Again, thank you for allowing me to participate in the care of your patient.        Sincerely,        Kylie Alcantara PA-C

## 2021-07-27 NOTE — NURSING NOTE
Chief Complaint   Patient presents with     Skin Check       Vitals:    07/27/21 1424   BP: 134/85   BP Location: Right arm   Patient Position: Sitting   Cuff Size: Adult Regular   Pulse: 60   SpO2: 97%     Wt Readings from Last 1 Encounters:   10/31/19 79.4 kg (175 lb)       Ayanna Martinez LPN .................7/27/2021

## 2021-09-19 ENCOUNTER — HEALTH MAINTENANCE LETTER (OUTPATIENT)
Age: 76
End: 2021-09-19

## 2021-09-23 ENCOUNTER — OFFICE VISIT (OUTPATIENT)
Dept: FAMILY MEDICINE | Facility: CLINIC | Age: 76
End: 2021-09-23
Payer: COMMERCIAL

## 2021-09-23 VITALS
HEART RATE: 54 BPM | RESPIRATION RATE: 16 BRPM | BODY MASS INDEX: 25.74 KG/M2 | HEIGHT: 67 IN | SYSTOLIC BLOOD PRESSURE: 110 MMHG | TEMPERATURE: 97.8 F | WEIGHT: 164 LBS | OXYGEN SATURATION: 98 % | DIASTOLIC BLOOD PRESSURE: 68 MMHG

## 2021-09-23 DIAGNOSIS — I10 HYPERTENSION GOAL BP (BLOOD PRESSURE) < 130/80: ICD-10-CM

## 2021-09-23 DIAGNOSIS — Z00.00 ENCOUNTER FOR MEDICARE ANNUAL WELLNESS EXAM: Primary | ICD-10-CM

## 2021-09-23 DIAGNOSIS — E78.5 HYPERLIPIDEMIA LDL GOAL <100: ICD-10-CM

## 2021-09-23 DIAGNOSIS — N18.30 STAGE 3 CHRONIC KIDNEY DISEASE, UNSPECIFIED WHETHER STAGE 3A OR 3B CKD (H): ICD-10-CM

## 2021-09-23 DIAGNOSIS — E78.5 HYPERLIPIDEMIA LDL GOAL <130: ICD-10-CM

## 2021-09-23 LAB
ANION GAP SERPL CALCULATED.3IONS-SCNC: 6 MMOL/L (ref 3–14)
BUN SERPL-MCNC: 18 MG/DL (ref 7–30)
CALCIUM SERPL-MCNC: 8.8 MG/DL (ref 8.5–10.1)
CHLORIDE BLD-SCNC: 109 MMOL/L (ref 94–109)
CHOLEST SERPL-MCNC: 200 MG/DL
CO2 SERPL-SCNC: 26 MMOL/L (ref 20–32)
CREAT SERPL-MCNC: 1.18 MG/DL (ref 0.52–1.04)
FASTING STATUS PATIENT QL REPORTED: YES
GFR SERPL CREATININE-BSD FRML MDRD: 44 ML/MIN/1.73M2
GLUCOSE BLD-MCNC: 91 MG/DL (ref 70–99)
HDLC SERPL-MCNC: 55 MG/DL
LDLC SERPL CALC-MCNC: 118 MG/DL
NONHDLC SERPL-MCNC: 145 MG/DL
POTASSIUM BLD-SCNC: 4 MMOL/L (ref 3.4–5.3)
SODIUM SERPL-SCNC: 141 MMOL/L (ref 133–144)
TRIGL SERPL-MCNC: 134 MG/DL

## 2021-09-23 PROCEDURE — 99213 OFFICE O/P EST LOW 20 MIN: CPT | Mod: 25 | Performed by: FAMILY MEDICINE

## 2021-09-23 PROCEDURE — 36415 COLL VENOUS BLD VENIPUNCTURE: CPT | Performed by: FAMILY MEDICINE

## 2021-09-23 PROCEDURE — 80061 LIPID PANEL: CPT | Performed by: FAMILY MEDICINE

## 2021-09-23 PROCEDURE — 80048 BASIC METABOLIC PNL TOTAL CA: CPT | Performed by: FAMILY MEDICINE

## 2021-09-23 PROCEDURE — 99397 PER PM REEVAL EST PAT 65+ YR: CPT | Performed by: FAMILY MEDICINE

## 2021-09-23 RX ORDER — METOPROLOL SUCCINATE 25 MG/1
25 TABLET, EXTENDED RELEASE ORAL DAILY
Qty: 90 TABLET | Refills: 3 | Status: CANCELLED | OUTPATIENT
Start: 2021-09-23

## 2021-09-23 RX ORDER — SIMVASTATIN 20 MG
20 TABLET ORAL AT BEDTIME
Qty: 90 TABLET | Refills: 3 | Status: SHIPPED | OUTPATIENT
Start: 2021-09-23 | End: 2022-11-04

## 2021-09-23 RX ORDER — METOPROLOL SUCCINATE 25 MG/1
12.5 TABLET, EXTENDED RELEASE ORAL DAILY
Qty: 45 TABLET | Refills: 3 | Status: SHIPPED | OUTPATIENT
Start: 2021-09-23 | End: 2022-11-04

## 2021-09-23 ASSESSMENT — ENCOUNTER SYMPTOMS
COUGH: 0
HEARTBURN: 0
DIZZINESS: 0
EYE PAIN: 0
WEAKNESS: 0
HEMATURIA: 0
NERVOUS/ANXIOUS: 0
SORE THROAT: 0
PARESTHESIAS: 0
FEVER: 0
ABDOMINAL PAIN: 0
FREQUENCY: 0
NAUSEA: 0
CHILLS: 0
HEMATOCHEZIA: 0
PALPITATIONS: 0
MYALGIAS: 0
SHORTNESS OF BREATH: 0
DIARRHEA: 0
ARTHRALGIAS: 0
BREAST MASS: 0
DYSURIA: 0
JOINT SWELLING: 0
HEADACHES: 0
CONSTIPATION: 0

## 2021-09-23 ASSESSMENT — ACTIVITIES OF DAILY LIVING (ADL): CURRENT_FUNCTION: NO ASSISTANCE NEEDED

## 2021-09-23 ASSESSMENT — MIFFLIN-ST. JEOR: SCORE: 1248.59

## 2021-09-23 NOTE — PATIENT INSTRUCTIONS
Patient Education   Personalized Prevention Plan  You are due for the preventive services outlined below.  Your care team is available to assist you in scheduling these services.  If you have already completed any of these items, please share that information with your care team to update in your medical record.  Health Maintenance Due   Topic Date Due     ANNUAL REVIEW OF HM ORDERS  Never done     Discuss Advance Care Planning  Never done     COVID-19 Vaccine (1) Never done     Hepatitis C Screening  Never done     Annual Wellness Visit  07/19/2020     Kidney Microalbumin Urine Test  07/19/2020     FALL RISK ASSESSMENT  07/19/2020     Flu Vaccine (1) 09/01/2021     Osteoporosis Screening  09/05/2021

## 2021-09-23 NOTE — PROGRESS NOTES
"SUBJECTIVE:   Seema Barnes is a 76 year old female who presents for Preventive Visit.      Patient has been advised of split billing requirements and indicates understanding: Yes   Are you in the first 12 months of your Medicare coverage?      Healthy Habits:     In general, how would you rate your overall health?  Excellent    Frequency of exercise:  4-5 days/week    Duration of exercise:  30-45 minutes    Do you usually eat at least 4 servings of fruit and vegetables a day, include whole grains    & fiber and avoid regularly eating high fat or \"junk\" foods?  No    Taking medications regularly:  No    Medication side effects:  None    Ability to successfully perform activities of daily living:  No assistance needed    Home Safety:  No safety concerns identified    Hearing Impairment:  No hearing concerns    In the past 6 months, have you been bothered by leaking of urine? Yes    In general, how would you rate your overall mental or emotional health?  Excellent      PHQ-2 Total Score: 0    Additional concerns today:  No    Do you feel safe in your environment? Yes    Have you ever done Advance Care Planning? (For example, a Health Directive, POLST, or a discussion with a medical provider or your loved ones about your wishes): No, advance care planning information given to patient to review.  Patient plans to discuss their wishes with loved ones or provider.         Fall risk  Fallen 2 or more times in the past year?: No  Any fall with injury in the past year?: No    Cognitive Screening   1) Repeat 3 items (Leader, Season, Table)    2) Clock draw: NORMAL  3) 3 item recall:  Recalls 3 objects  Results: 3 items recalled: COGNITIVE IMPAIRMENT LESS LIKELY    Mini-CogTM Copyright LIVIA Chapin. Licensed by the author for use in [facility]; reprinted with permission (demetria@.[email or web address]). All rights reserved.      Do you have sleep apnea, excessive snoring or daytime drowsiness?: no    Reviewed and updated as needed " this visit by clinical staff  Tobacco  Allergies  Meds   Med Hx  Surg Hx  Fam Hx  Soc Hx        Reviewed and updated as needed this visit by Provider                Social History     Tobacco Use     Smoking status: Never Smoker     Smokeless tobacco: Never Used   Substance Use Topics     Alcohol use: No     Alcohol/week: 0.0 standard drinks     Comment: One glass wine every 2-3 months     If you drink alcohol do you typically have >3 drinks per day or >7 drinks per week? No    Alcohol Use 9/23/2021   Prescreen: >3 drinks/day or >7 drinks/week? Not Applicable   Prescreen: >3 drinks/day or >7 drinks/week? -       Hyperlipidemia Follow-Up      Are you regularly taking any medication or supplement to lower your cholesterol?   Yes- simvastatin - takes 1/2 dose    Are you having muscle aches or other side effects that you think could be caused by your cholesterol lowering medication?  No    Hypertension Follow-up      Do you check your blood pressure regularly outside of the clinic? No     Are you following a low salt diet? Yes    Are your blood pressures ever more than 140 on the top number (systolic) OR more   than 90 on the bottom number (diastolic), for example 140/90? No             Current providers sharing in care for this patient include:   Patient Care Team:  Nat Houston MD as PCP - General (Family Practice)  Nat Houston MD as Assigned PCP  Kylie Pemberton PA-C as Physician Assistant (Dermatology)    The following health maintenance items are reviewed in Epic and correct as of today:  Health Maintenance Due   Topic Date Due     COVID-19 Vaccine (1) Never done     MICROALBUMIN  07/19/2020     FALL RISK ASSESSMENT  07/19/2020     INFLUENZA VACCINE (1) 09/01/2021     Lab work is in process      Mammogram Screening - Patient over age 75, has elected to continue with screening.  Pertinent mammograms are reviewed under the imaging tab.    Review of Systems   Constitutional: Negative for chills  "and fever.   HENT: Negative for congestion, ear pain, hearing loss and sore throat.    Eyes: Negative for pain and visual disturbance.   Respiratory: Negative for cough and shortness of breath.    Cardiovascular: Negative for chest pain, palpitations and peripheral edema.   Gastrointestinal: Negative for abdominal pain, constipation, diarrhea, heartburn, hematochezia and nausea.   Breasts:  Negative for tenderness, breast mass and discharge.   Genitourinary: Negative for dysuria, frequency, genital sores, hematuria, pelvic pain, urgency, vaginal bleeding and vaginal discharge.   Musculoskeletal: Negative for arthralgias, joint swelling and myalgias.   Skin: Negative for rash.   Neurological: Negative for dizziness, weakness, headaches and paresthesias.   Psychiatric/Behavioral: Negative for mood changes. The patient is not nervous/anxious.          OBJECTIVE:   /68 (BP Location: Right arm, Patient Position: Sitting, Cuff Size: Adult Large)   Pulse 54   Temp 97.8  F (36.6  C) (Tympanic)   Resp 16   Ht 1.689 m (5' 6.5\")   Wt 74.4 kg (164 lb)   SpO2 98%   Breastfeeding No   BMI 26.07 kg/m   Estimated body mass index is 26.07 kg/m  as calculated from the following:    Height as of this encounter: 1.689 m (5' 6.5\").    Weight as of this encounter: 74.4 kg (164 lb).  Physical Exam  GENERAL: healthy, alert and no distress  EYES: Eyes grossly normal to inspection, PERRL and conjunctivae and sclerae normal  HENT: ear canals and TM's normal, nose and mouth without ulcers or lesions  NECK: no adenopathy, no asymmetry, masses, or scars and thyroid normal to palpation  RESP: lungs clear to auscultation - no rales, rhonchi or wheezes  CV: regular rate and rhythm, normal S1 S2, no S3 or S4, no murmur, click or rub, no peripheral edema and peripheral pulses strong  ABDOMEN: soft, nontender, no hepatosplenomegaly, no masses and bowel sounds normal  MS: no gross musculoskeletal defects noted, no edema  SKIN: no " "suspicious lesions or rashes  NEURO: Normal strength and tone, mentation intact and speech normal  PSYCH: mentation appears normal, affect normal/bright    Diagnostic Test Results:  Labs reviewed in Epic    ASSESSMENT / PLAN:   (Z00.00) Encounter for Medicare annual wellness exam  (primary encounter diagnosis)  Comment:    Plan:      (E78.5) Hyperlipidemia LDL goal <130  Comment:    Plan: Lipid panel reflex to direct LDL Fasting,         OFFICE/OUTPT VISIT,EST,LEVL III             (I10) Hypertension goal BP (blood pressure) < 130/80  Comment: well controlled, mild bradycardia  Plan: Basic metabolic panel  (Ca, Cl, CO2, Creat,         Gluc, K, Na, BUN), OFFICE/OUTPT VISIT,EST,LEVL         III, metoprolol succinate ER (TOPROL-XL) 25 MG         24 hr tablet             (E78.5) Hyperlipidemia LDL goal <100  Comment:    Plan: simvastatin (ZOCOR) 20 MG tablet             (N18.30) Stage 3 chronic kidney disease, unspecified whether stage 3a or 3b CKD  Comment:    Plan: Albumin Random Urine Quantitative with Creat         Ratio               Patient has been advised of split billing requirements and indicates understanding: Yes  COUNSELING:  Reviewed preventive health counseling, as reflected in patient instructions       Regular exercise       Healthy diet/nutrition    Estimated body mass index is 26.07 kg/m  as calculated from the following:    Height as of this encounter: 1.689 m (5' 6.5\").    Weight as of this encounter: 74.4 kg (164 lb).        She reports that she has never smoked. She has never used smokeless tobacco.      Appropriate preventive services were discussed with this patient, including applicable screening as appropriate for cardiovascular disease, diabetes, osteopenia/osteoporosis, and glaucoma.  As appropriate for age/gender, discussed screening for colorectal cancer, prostate cancer, breast cancer, and cervical cancer. Checklist reviewing preventive services available has been given to the " patient.    Reviewed patients plan of care and provided an AVS. The Basic Care Plan (routine screening as documented in Health Maintenance) for Seema meets the Care Plan requirement. This Care Plan has been established and reviewed with the Patient.    Counseling Resources:  ATP IV Guidelines  Pooled Cohorts Equation Calculator  Breast Cancer Risk Calculator  Breast Cancer: Medication to Reduce Risk  FRAX Risk Assessment  ICSI Preventive Guidelines  Dietary Guidelines for Americans, 2010  USDA's MyPlate  ASA Prophylaxis  Lung CA Screening    Nat Houston MD  [facility]    Identified Health Risks:

## 2022-11-04 ENCOUNTER — OFFICE VISIT (OUTPATIENT)
Dept: FAMILY MEDICINE | Facility: CLINIC | Age: 77
End: 2022-11-04
Payer: COMMERCIAL

## 2022-11-04 VITALS
HEART RATE: 65 BPM | HEIGHT: 66 IN | OXYGEN SATURATION: 99 % | WEIGHT: 176 LBS | BODY MASS INDEX: 28.28 KG/M2 | DIASTOLIC BLOOD PRESSURE: 82 MMHG | TEMPERATURE: 99.6 F | SYSTOLIC BLOOD PRESSURE: 122 MMHG | RESPIRATION RATE: 16 BRPM

## 2022-11-04 DIAGNOSIS — N18.30 STAGE 3 CHRONIC KIDNEY DISEASE, UNSPECIFIED WHETHER STAGE 3A OR 3B CKD (H): ICD-10-CM

## 2022-11-04 DIAGNOSIS — Z91.030 BEE ALLERGY STATUS: ICD-10-CM

## 2022-11-04 DIAGNOSIS — I10 HYPERTENSION GOAL BP (BLOOD PRESSURE) < 130/80: ICD-10-CM

## 2022-11-04 DIAGNOSIS — E78.5 HYPERLIPIDEMIA LDL GOAL <100: ICD-10-CM

## 2022-11-04 DIAGNOSIS — Z00.00 ENCOUNTER FOR MEDICARE ANNUAL WELLNESS EXAM: Primary | ICD-10-CM

## 2022-11-04 LAB
ANION GAP SERPL CALCULATED.3IONS-SCNC: 9 MMOL/L (ref 7–15)
BUN SERPL-MCNC: 25.2 MG/DL (ref 8–23)
CALCIUM SERPL-MCNC: 10.1 MG/DL (ref 8.8–10.2)
CHLORIDE SERPL-SCNC: 107 MMOL/L (ref 98–107)
CHOLEST SERPL-MCNC: 192 MG/DL
CREAT SERPL-MCNC: 1.14 MG/DL (ref 0.51–0.95)
DEPRECATED HCO3 PLAS-SCNC: 26 MMOL/L (ref 22–29)
GFR SERPL CREATININE-BSD FRML MDRD: 49 ML/MIN/1.73M2
GLUCOSE SERPL-MCNC: 111 MG/DL (ref 70–99)
HDLC SERPL-MCNC: 44 MG/DL
HGB BLD-MCNC: 14.4 G/DL (ref 11.7–15.7)
LDLC SERPL CALC-MCNC: 122 MG/DL
NONHDLC SERPL-MCNC: 148 MG/DL
POTASSIUM SERPL-SCNC: 4.5 MMOL/L (ref 3.4–5.3)
SODIUM SERPL-SCNC: 142 MMOL/L (ref 136–145)
TRIGL SERPL-MCNC: 132 MG/DL

## 2022-11-04 PROCEDURE — 99213 OFFICE O/P EST LOW 20 MIN: CPT | Mod: 25 | Performed by: FAMILY MEDICINE

## 2022-11-04 PROCEDURE — G0439 PPPS, SUBSEQ VISIT: HCPCS | Performed by: FAMILY MEDICINE

## 2022-11-04 PROCEDURE — 80061 LIPID PANEL: CPT | Performed by: FAMILY MEDICINE

## 2022-11-04 PROCEDURE — 80048 BASIC METABOLIC PNL TOTAL CA: CPT | Performed by: FAMILY MEDICINE

## 2022-11-04 PROCEDURE — 85018 HEMOGLOBIN: CPT | Performed by: FAMILY MEDICINE

## 2022-11-04 PROCEDURE — 36415 COLL VENOUS BLD VENIPUNCTURE: CPT | Performed by: FAMILY MEDICINE

## 2022-11-04 RX ORDER — EPINEPHRINE 0.3 MG/.3ML
0.3 INJECTION SUBCUTANEOUS PRN
Qty: 2 EACH | Refills: 1 | Status: SHIPPED | OUTPATIENT
Start: 2022-11-04 | End: 2023-11-15

## 2022-11-04 RX ORDER — METOPROLOL SUCCINATE 25 MG/1
12.5 TABLET, EXTENDED RELEASE ORAL DAILY
Qty: 45 TABLET | Refills: 3 | Status: SHIPPED | OUTPATIENT
Start: 2022-11-04 | End: 2023-11-15

## 2022-11-04 RX ORDER — SIMVASTATIN 20 MG
20 TABLET ORAL AT BEDTIME
Qty: 90 TABLET | Refills: 3 | Status: SHIPPED | OUTPATIENT
Start: 2022-11-04 | End: 2023-11-15

## 2022-11-04 ASSESSMENT — ENCOUNTER SYMPTOMS
PARESTHESIAS: 0
DIZZINESS: 0
NERVOUS/ANXIOUS: 0
DYSURIA: 0
COUGH: 0
EYE PAIN: 0
DIARRHEA: 0
SORE THROAT: 0
FEVER: 0
ARTHRALGIAS: 0
CHILLS: 0
BREAST MASS: 0
ABDOMINAL PAIN: 0
SHORTNESS OF BREATH: 0
NAUSEA: 0
HEMATURIA: 0
FREQUENCY: 0
MYALGIAS: 0
HEARTBURN: 0
JOINT SWELLING: 0
WEAKNESS: 0
CONSTIPATION: 0
HEMATOCHEZIA: 0
HEADACHES: 0
PALPITATIONS: 0

## 2022-11-04 ASSESSMENT — ACTIVITIES OF DAILY LIVING (ADL): CURRENT_FUNCTION: NO ASSISTANCE NEEDED

## 2022-11-04 ASSESSMENT — PAIN SCALES - GENERAL: PAINLEVEL: NO PAIN (0)

## 2022-11-04 NOTE — RESULT ENCOUNTER NOTE
Seema,    These labs are normal or acceptable.  The hemoglobin is done due to your chronic kidney disease, this is also why I ordered the urine specimen, but it appears you declined to have that done.    Please contact my office if you have questions.    Nat Houston M.D.

## 2022-11-04 NOTE — PATIENT INSTRUCTIONS
Health Maintenance reviewed and plan for update discussed.  Patient asked to schedule her mammogram  [phone]      Our Clinic hours are:  Mondays    7:20 am - 7 pm  Tues -  Fri  7:20 am - 5 pm    Clinic Phone: [phone]    The clinic lab opens at 7:30 am Mon - Fri and appointments are required.    [facility]. [phone]  Monday  8 am - 7pm  Tues - Fri 8 am - 5:30 pm       Patient Education   Personalized Prevention Plan  You are due for the preventive services outlined below.  Your care team is available to assist you in scheduling these services.  If you have already completed any of these items, please share that information with your care team to update in your medical record.  Health Maintenance Due   Topic Date Due    Urine Test  Never done    Hemoglobin  06/18/2015    Kidney Microalbumin Urine Test  07/19/2020    Annual Wellness Visit  09/23/2022    Basic Metabolic Panel  09/23/2022    Cholesterol Lab  09/23/2022    ANNUAL REVIEW OF HM ORDERS  09/23/2022

## 2022-11-04 NOTE — PROGRESS NOTES
"SUBJECTIVE:   Seema is a 77 year old who presents for Preventive Visit.      Patient has been advised of split billing requirements and indicates understanding: Yes  Are you in the first 12 months of your Medicare coverage?  No    Healthy Habits:     In general, how would you rate your overall health?  Excellent    Frequency of exercise:  4-5 days/week    Duration of exercise:  30-45 minutes    Do you usually eat at least 4 servings of fruit and vegetables a day, include whole grains    & fiber and avoid regularly eating high fat or \"junk\" foods?  No    Taking medications regularly:  Yes    Medication side effects:  Not applicable    Ability to successfully perform activities of daily living:  No assistance needed    Home Safety:  No safety concerns identified    Hearing Impairment:  No hearing concerns    In the past 6 months, have you been bothered by leaking of urine? Yes    In general, how would you rate your overall mental or emotional health?  Excellent      PHQ-2 Total Score: 0    Additional concerns today:  No    Do you feel safe in your environment? Yes    Have you ever done Advance Care Planning? (For example, a Health Directive, POLST, or a discussion with a medical provider or your loved ones about your wishes): No, advance care planning information given to patient to review.  Patient plans to discuss their wishes with loved ones or provider.         Fall risk  Fallen 2 or more times in the past year?: No  Any fall with injury in the past year?: No    Cognitive Screening   1) Repeat 3 items (Leader, Season, Table)    2) Clock draw: NORMAL  3) 3 item recall: Recalls 3 objects  Results: 3 items recalled: COGNITIVE IMPAIRMENT LESS LIKELY    Mini-CogTM Copyright LIVIA Chapin. Licensed by the author for use in [facility]; reprinted with permission (demetria@.[facility]). All rights reserved.      Do you have sleep apnea, excessive snoring or daytime drowsiness?: no    Reviewed and updated as needed this " visit by clinical staff   Tobacco  Allergies  Meds   Med Hx  Surg Hx  Fam Hx  Soc Hx        Reviewed and updated as needed this visit by Provider                 Social History     Tobacco Use     Smoking status: Never     Smokeless tobacco: Never   Substance Use Topics     Alcohol use: No     Alcohol/week: 0.0 standard drinks     Comment: One glass wine every 2-3 months     If you drink alcohol do you typically have >3 drinks per day or >7 drinks per week? No    Alcohol Use 11/4/2022   Prescreen: >3 drinks/day or >7 drinks/week? No   Prescreen: >3 drinks/day or >7 drinks/week? -   No flowsheet data found.        Hyperlipidemia Follow-Up      Are you regularly taking any medication or supplement to lower your cholesterol?   Yes- simvastatin    Are you having muscle aches or other side effects that you think could be caused by your cholesterol lowering medication?  No    Hypertension Follow-up      Do you check your blood pressure regularly outside of the clinic? No     Are you following a low salt diet? No    Are your blood pressures ever more than 140 on the top number (systolic) OR more   than 90 on the bottom number (diastolic), for example 140/90? No      Current providers sharing in care for this patient include:   Patient Care Team:  Nat Houston MD as PCP - General (Family Practice)  Nat Houston MD as Assigned PCP  Kylie Pemberton PA-C as Physician Assistant (Dermatology)    The following health maintenance items are reviewed in Epic and correct as of today:  Health Maintenance   Topic Date Due     URINALYSIS  Never done     HEMOGLOBIN  06/18/2015     MICROALBUMIN  07/19/2020     MEDICARE ANNUAL WELLNESS VISIT  09/23/2022     BMP  09/23/2022     LIPID  09/23/2022     ANNUAL REVIEW OF HM ORDERS  09/23/2022     DEXA  09/23/2023 (Originally 9/5/2021)     FALL RISK ASSESSMENT  11/04/2023     ADVANCE CARE PLANNING  11/04/2027     DTAP/TDAP/TD IMMUNIZATION (3 - Td or Tdap) 07/19/2029      "PHQ-2 (once per calendar year)  Completed     INFLUENZA VACCINE  Completed     Pneumococcal Vaccine: 65+ Years  Completed     ZOSTER IMMUNIZATION  Completed     COVID-19 Vaccine  Completed     IPV IMMUNIZATION  Aged Out     MENINGITIS IMMUNIZATION  Aged Out     HEPATITIS C SCREENING  Discontinued     COLONOSCOPY  Discontinued     HEPATITIS B IMMUNIZATION  Discontinued     Lab work is in process  Labs reviewed in EPIC      Mammogram Screening - Patient over age 75, has elected to discontinue screenings.  Pertinent mammograms are reviewed under the imaging tab.    Review of Systems   Constitutional: Negative for chills and fever.   HENT: Negative for congestion, ear pain, hearing loss and sore throat.    Eyes: Negative for pain and visual disturbance.   Respiratory: Negative for cough and shortness of breath.    Cardiovascular: Negative for chest pain, palpitations and peripheral edema.   Gastrointestinal: Negative for abdominal pain, constipation, diarrhea, heartburn, hematochezia and nausea.   Breasts:  Negative for tenderness, breast mass and discharge.   Genitourinary: Negative for dysuria, frequency, genital sores, hematuria, pelvic pain, urgency, vaginal bleeding and vaginal discharge.   Musculoskeletal: Negative for arthralgias, joint swelling and myalgias.   Skin: Negative for rash.   Neurological: Negative for dizziness, weakness, headaches and paresthesias.   Psychiatric/Behavioral: Negative for mood changes. The patient is not nervous/anxious.          OBJECTIVE:   /82   Pulse 65   Temp 99.6  F (37.6  C) (Tympanic)   Resp 16   Wt 79.8 kg (176 lb)   SpO2 99%   BMI 27.98 kg/m   Estimated body mass index is 27.98 kg/m  as calculated from the following:    Height as of 9/23/21: 1.689 m (5' 6.5\").    Weight as of this encounter: 79.8 kg (176 lb).  Physical Exam  GENERAL: healthy, alert and no distress  NECK: no adenopathy, no asymmetry, masses, or scars and thyroid normal to palpation  RESP: lungs " "clear to auscultation - no rales, rhonchi or wheezes  CV: regular rate and rhythm, normal S1 S2, no S3 or S4, no murmur, click or rub, no peripheral edema and peripheral pulses strong  MS: no gross musculoskeletal defects noted, no edema  NEURO: Normal strength and tone, mentation intact and speech normal  PSYCH: mentation appears normal, affect normal/bright    Diagnostic Test Results:  Labs reviewed in Epic    ASSESSMENT / PLAN:   (Z00.00) Encounter for Medicare annual wellness exam  (primary encounter diagnosis)  Comment:    Plan:      (I10) Hypertension goal BP (blood pressure) < 130/80  Comment: well controlled  Plan: metoprolol succinate ER (TOPROL XL) 25 MG 24 hr        tablet        Continue current    (E78.5) Hyperlipidemia LDL goal <100  Comment:    Plan: simvastatin (ZOCOR) 20 MG tablet, Lipid panel         reflex to direct LDL Fasting             (N18.30) Stage 3 chronic kidney disease, unspecified whether stage 3a or 3b CKD (H)  Comment: continue monitoring, needs monitoring labs today  Plan: Albumin Random Urine Quantitative with Creat         Ratio, Basic metabolic panel, Hemoglobin, UA         with Microscopic - lab collect             (Z91.030) Bee allergy status  Comment: has had anaphylaxis in the past, would like another epinephrine pen  Plan: EPINEPHrine (ANY BX GENERIC EQUIV) 0.3 MG/0.3ML        injection 2-pack               Patient has been advised of split billing requirements and indicates understanding: Yes      COUNSELING:  Reviewed preventive health counseling, as reflected in patient instructions       Regular exercise       Healthy diet/nutrition    Estimated body mass index is 27.98 kg/m  as calculated from the following:    Height as of 9/23/21: 1.689 m (5' 6.5\").    Weight as of this encounter: 79.8 kg (176 lb).        She reports that she has never smoked. She has never used smokeless tobacco.      Appropriate preventive services were discussed with this patient, including " applicable screening as appropriate for cardiovascular disease, diabetes, osteopenia/osteoporosis, and glaucoma.  As appropriate for age/gender, discussed screening for colorectal cancer, prostate cancer, breast cancer, and cervical cancer. Checklist reviewing preventive services available has been given to the patient.    Reviewed patients plan of care and provided an AVS. The Basic Care Plan (routine screening as documented in Health Maintenance) for Seema meets the Care Plan requirement. This Care Plan has been established and reviewed with the Patient.    Counseling Resources:  ATP IV Guidelines  Pooled Cohorts Equation Calculator  Breast Cancer Risk Calculator  Breast Cancer: Medication to Reduce Risk  FRAX Risk Assessment  ICSI Preventive Guidelines  Dietary Guidelines for Americans, 2010  [employer] [place]'s MyPlate  ASA Prophylaxis  Lung CA Screening    Nat Houston MD  [facility]    Identified Health Risks:

## 2022-11-07 NOTE — RESULT ENCOUNTER NOTE
Seema,    These labs are normal or acceptable.    Kidney function is stable to slightly improved from a year ago.  Continue to avoid NSAIDS (ibuprofen, naproxen) and increase fluid intake.    Please contact my office if you have questions.    Nat Houston M.D.

## 2023-06-24 ENCOUNTER — HOSPITAL ENCOUNTER (EMERGENCY)
Facility: CLINIC | Age: 78
Discharge: HOME | End: 2023-06-24
Attending: PHYSICIAN ASSISTANT | Admitting: PHYSICIAN ASSISTANT
Payer: COMMERCIAL

## 2023-06-24 VITALS
TEMPERATURE: 99 F | SYSTOLIC BLOOD PRESSURE: 139 MMHG | DIASTOLIC BLOOD PRESSURE: 84 MMHG | OXYGEN SATURATION: 97 % | HEART RATE: 73 BPM | RESPIRATION RATE: 18 BRPM

## 2023-06-24 DIAGNOSIS — R19.7 ACUTE DIARRHEA: ICD-10-CM

## 2023-06-24 DIAGNOSIS — R11.0 NAUSEA: ICD-10-CM

## 2023-06-24 LAB
FLUAV RNA SPEC QL NAA+PROBE: NEGATIVE
FLUBV RNA RESP QL NAA+PROBE: NEGATIVE
RSV RNA SPEC NAA+PROBE: NEGATIVE
SARS-COV-2 RNA RESP QL NAA+PROBE: NEGATIVE

## 2023-06-24 PROCEDURE — G0463 HOSPITAL OUTPT CLINIC VISIT: HCPCS | Performed by: PHYSICIAN ASSISTANT

## 2023-06-24 PROCEDURE — 99213 OFFICE O/P EST LOW 20 MIN: CPT | Performed by: PHYSICIAN ASSISTANT

## 2023-06-24 PROCEDURE — 87637 SARSCOV2&INF A&B&RSV AMP PRB: CPT | Performed by: PHYSICIAN ASSISTANT

## 2023-06-24 RX ORDER — ONDANSETRON 4 MG/1
4 TABLET, ORALLY DISINTEGRATING ORAL EVERY 8 HOURS PRN
Qty: 9 TABLET | Refills: 0 | Status: SHIPPED | OUTPATIENT
Start: 2023-06-24 | End: 2023-08-08

## 2023-06-24 ASSESSMENT — ENCOUNTER SYMPTOMS
VOMITING: 0
NAUSEA: 1
CONSTITUTIONAL NEGATIVE: 1
DIARRHEA: 1
BLOOD IN STOOL: 0
ANAL BLEEDING: 0
ABDOMINAL DISTENTION: 0
CARDIOVASCULAR NEGATIVE: 1
RESPIRATORY NEGATIVE: 1
CONSTIPATION: 0

## 2023-06-24 ASSESSMENT — ACTIVITIES OF DAILY LIVING (ADL): ADLS_ACUITY_SCORE: 35

## 2023-06-24 NOTE — ED PROVIDER NOTES
History     Chief Complaint   Patient presents with     Diarrhea     X 1 week. Tried Immodium and a bland diet.      Nausea     HPI  Seema Barnes is a 78 year old female with a past medical history of stage III chronic kidney disease, hypertension and hyperlipidemia who presents for evaluation of loose stools which have been ongoing over the past week.  Feels some nausea currently but no vomiting.  No blood noticed in the diarrhea.  She describes her stools as loose and watery, has had 2 loose stools per day. Feels like she had explosive diarrhea initially, which is now subsided, now having occasional loose and watery stools.  She denies any recent medication changes or antibiotic use.  However she did travel to [place] about 1 week ago to celebrate her 78th birthday with several friends.  No known exposure to illness.  She has tried eating a bland diet, is also taking Imodium on occasion over the past week which has helped alleviate her symptoms somewhat.  States that she had no loose stools yesterday but then noticed loose stools again today.  She denies chest pain or shortness of breath, no fevers, no abdominal pain or nausea/vomiting.  No rashes.  Has a history of urge incontinence, no new urinary concerns.      Allergies:  Allergies   Allergen Reactions     Wasps [Hornets] Anaphylaxis     No Known Drug Allergy        Problem List:    Patient Active Problem List    Diagnosis Date Noted     Family history of ischemic heart disease 11/16/2017     Priority: Medium     Hyperlipidemia LDL goal <130 12/19/2012     Priority: Medium     Hypertension goal BP (blood pressure) < 130/80 12/05/2011     Priority: Medium     Stage 3 chronic kidney disease, unspecified whether stage 3a or 3b CKD (H) 10/04/2011     Priority: Medium     Per provider            Past Medical History:    Past Medical History:   Diagnosis Date     MEDICAL HISTORY OF -      Pure hypercholesterolemia      Unspecified essential hypertension         Past Surgical History:    Past Surgical History:   Procedure Laterality Date     HC REMOVAL OF TONSILS,<11 Y/O      Tonsils <10y.o.     SURGICAL HISTORY OF -       tubal ligation       Family History:    Family History   Problem Relation Age of Onset     C.A.D. Mother 50        MI-58     Hypertension Mother      AGUSTÍN.A.D. Maternal Aunt      JORDANARUFINO. Maternal Uncle      JORDANARUFINO. Sister         angioplasty and stint-age 61     Hypertension Sister      Hypertension Brother      Diabetes No family hx of      Cerebrovascular Disease No family hx of      Breast Cancer No family hx of      Cancer - colorectal No family hx of        Social History:  Marital Status:   [2]  Social History     Tobacco Use     Smoking status: Never     Smokeless tobacco: Never   Vaping Use     Vaping Use: Never used   Substance Use Topics     Alcohol use: No     Alcohol/week: 0.0 standard drinks of alcohol     Comment: One glass wine every 2-3 months     Drug use: No        Medications:    ondansetron (ZOFRAN ODT) 4 MG ODT tab  EPINEPHrine (ANY BX GENERIC EQUIV) 0.3 MG/0.3ML injection 2-pack  metoprolol succinate ER (TOPROL XL) 25 MG 24 hr tablet  simvastatin (ZOCOR) 20 MG tablet          Review of Systems   Constitutional: Negative.    HENT: Negative.    Respiratory: Negative.    Cardiovascular: Negative.    Gastrointestinal: Positive for diarrhea and nausea. Negative for abdominal distention, abdominal pain, anal bleeding, blood in stool, constipation and vomiting.       Physical Exam   BP: 139/84  Pulse: 73  Temp: 99  F (37.2  C)  Resp: 18  SpO2: 97 %      Physical Exam  Constitutional:       General: She is not in acute distress.     Appearance: Normal appearance. She is not ill-appearing, toxic-appearing or diaphoretic.   HENT:      Head: Normocephalic and atraumatic.      Right Ear: No middle ear effusion. No mastoid tenderness. Tympanic membrane is not injected, perforated, erythematous, retracted or bulging.      Left Ear:  No  middle ear effusion. No mastoid tenderness. Tympanic membrane is not injected, perforated, erythematous, retracted or bulging.      Mouth/Throat:      Mouth: Mucous membranes are moist.      Pharynx: Oropharynx is clear. No oropharyngeal exudate or posterior oropharyngeal erythema.   Cardiovascular:      Rate and Rhythm: Normal rate and regular rhythm.      Pulses: Normal pulses.      Heart sounds: Normal heart sounds. No murmur heard.  Pulmonary:      Effort: Pulmonary effort is normal. No respiratory distress.      Breath sounds: Normal breath sounds. No stridor. No wheezing, rhonchi or rales.   Chest:      Chest wall: No tenderness.   Abdominal:      General: Abdomen is flat. Bowel sounds are normal. There is no distension.      Palpations: Abdomen is soft.      Tenderness: There is no abdominal tenderness. There is no guarding or rebound.   Musculoskeletal:      Cervical back: No muscular tenderness.   Lymphadenopathy:      Cervical:      Right cervical: No superficial, deep or posterior cervical adenopathy.     Left cervical: No superficial, deep or posterior cervical adenopathy.   Neurological:      Mental Status: She is alert and oriented to person, place, and time.      Sensory: No sensory deficit.         ED Course                 Procedures              No results found for this or any previous visit (from the past 24 hour(s)).    Medications - No data to display    Assessments & Plan (with Medical Decision Making)     The patient is an 78-year-old female who presents for evaluation of loose stools over the past week, since traveling to [place] for a birthday party with friends.  She has not had any fevers, no abdominal pain, nausea or vomiting.  Patient is not dehydrated on exam.  Suspect foodborne illness.  Discussed that diarrhea is typically self-limiting, typically resolves within 3 days to a week.  Stool culture ordered, results are pending.  Negative results for COVID-19 today.  She is in no acute  distress, no worrisome findings on physical exam today.  Prescribe Zofran for symptomatic relief of nausea.      Seek urgent medical evaluation if you develop fevers, rashes, acutely worsening abdominal pain or pelvic pain, increased frequency or burning with urination, blood in the stools, or acutely worsening diarrhea, or difficulty keeping food and fluids down for over 24 hours.    I have reviewed the nursing notes.    I have reviewed the findings, diagnosis, plan and need for follow up with the patient.      New Prescriptions    ONDANSETRON (ZOFRAN ODT) 4 MG ODT TAB    Take 1 tablet (4 mg) by mouth every 8 hours as needed for nausea       Final diagnoses:   Acute diarrhea   Nausea       6/24/2023   [facility] EMERGENCY DEPT     Tim Walker PA-C  06/27/23 [id number]

## 2023-06-24 NOTE — DISCHARGE INSTRUCTIONS
Seek urgent medical evaluation if you develop fevers, rashes, acutely worsening abdominal pain or pelvic pain, increased frequency or burning with urination, blood in the stools, or acutely worsening diarrhea, or difficulty keeping food and fluids down for over 24 hours.    Recommend continuing a low fiber diet.

## 2023-06-25 ENCOUNTER — LAB (OUTPATIENT)
Dept: LAB | Facility: CLINIC | Age: 78
End: 2023-06-25
Payer: COMMERCIAL

## 2023-06-25 DIAGNOSIS — R19.7 ACUTE DIARRHEA: ICD-10-CM

## 2023-06-25 LAB
C COLI+JEJUNI+LARI FUSA STL QL NAA+PROBE: NOT DETECTED
C DIFF TOX B STL QL: NEGATIVE
EC STX1 GENE STL QL NAA+PROBE: NOT DETECTED
EC STX2 GENE STL QL NAA+PROBE: NOT DETECTED
NOROV GI+II ORF1-ORF2 JNC STL QL NAA+PR: NOT DETECTED
RVA NSP5 STL QL NAA+PROBE: NOT DETECTED
SALMONELLA SP RPOD STL QL NAA+PROBE: NOT DETECTED
SHIGELLA SP+EIEC IPAH STL QL NAA+PROBE: NOT DETECTED
V CHOL+PARA RFBL+TRKH+TNAA STL QL NAA+PR: NOT DETECTED
Y ENTERO RECN STL QL NAA+PROBE: NOT DETECTED

## 2023-06-25 PROCEDURE — 87493 C DIFF AMPLIFIED PROBE: CPT | Mod: XU

## 2023-06-25 PROCEDURE — 87506 IADNA-DNA/RNA PROBE TQ 6-11: CPT

## 2023-06-27 ENCOUNTER — TELEPHONE (OUTPATIENT)
Dept: DERMATOLOGY | Facility: CLINIC | Age: 78
End: 2023-06-27

## 2023-06-27 ENCOUNTER — OFFICE VISIT (OUTPATIENT)
Dept: DERMATOLOGY | Facility: CLINIC | Age: 78
End: 2023-06-27
Payer: COMMERCIAL

## 2023-06-27 DIAGNOSIS — L82.1 SEBORRHEIC KERATOSIS: ICD-10-CM

## 2023-06-27 DIAGNOSIS — C44.212 BASAL CELL CARCINOMA (BCC) OF RIGHT EAR: Primary | ICD-10-CM

## 2023-06-27 DIAGNOSIS — C44.722 SQUAMOUS CELL CARCINOMA OF LEG, RIGHT: ICD-10-CM

## 2023-06-27 DIAGNOSIS — L82.0 INFLAMED SEBORRHEIC KERATOSIS: Primary | ICD-10-CM

## 2023-06-27 DIAGNOSIS — L81.4 LENTIGO: ICD-10-CM

## 2023-06-27 DIAGNOSIS — D18.01 CHERRY ANGIOMA: ICD-10-CM

## 2023-06-27 DIAGNOSIS — D48.5 NEOPLASM OF UNCERTAIN BEHAVIOR OF SKIN: ICD-10-CM

## 2023-06-27 PROCEDURE — 88331 PATH CONSLTJ SURG 1 BLK 1SPC: CPT | Performed by: DERMATOLOGY

## 2023-06-27 PROCEDURE — 99213 OFFICE O/P EST LOW 20 MIN: CPT | Mod: 25 | Performed by: PHYSICIAN ASSISTANT

## 2023-06-27 PROCEDURE — 11103 TANGNTL BX SKIN EA SEP/ADDL: CPT | Mod: 59 | Performed by: PHYSICIAN ASSISTANT

## 2023-06-27 PROCEDURE — 17110 DESTRUCTION B9 LES UP TO 14: CPT | Performed by: PHYSICIAN ASSISTANT

## 2023-06-27 PROCEDURE — 11102 TANGNTL BX SKIN SINGLE LES: CPT | Mod: 59 | Performed by: PHYSICIAN ASSISTANT

## 2023-06-27 ASSESSMENT — ENCOUNTER SYMPTOMS: ABDOMINAL PAIN: 0

## 2023-06-27 ASSESSMENT — PAIN SCALES - GENERAL: PAINLEVEL: NO PAIN (0)

## 2023-06-27 NOTE — LETTER
6/27/2023         RE: Seema Barnes  [address] [address] [id number]        Dear Colleague,    Thank you for referring your patient, Seema Barnes, to the [facility]. Please see a copy of my visit note below.    R post ear (red):Orthokeratosis of epidermis with a proliferation of nests of basaloid cells, with peripheral palisading and a haphazard arrangement in the center extending into the dermis, forming nodules.  The tumor cells have hyperchromatic nuclei. Poor cytoplasm and intercellular bridging.      R post thigh (blue):There is hyperkeratosis & parakeratosis of the epidermis, with full thickness epidermal involvement by atypical keratinocytes with rare pale vacuolated cells invading dermis.      R post ear basal cell carcinoma   R post thigh squamous cell carcinoma   Schedule excision       Again, thank you for allowing me to participate in the care of your patient.        Sincerely,        Abdoulaye Ho MD

## 2023-06-27 NOTE — TELEPHONE ENCOUNTER
Patient notified. Patient verbalized understanding. Scheduled for MOHS Surgery x 2. Mohs Pre-op letter sent via My chart. Tatyana Rutledge RN

## 2023-06-27 NOTE — PATIENT INSTRUCTIONS
Wound Care Instructions     FOR SUPERFICIAL WOUNDS     [facility] [phone]    [facility] [phone]                       AFTER 24 HOURS YOU SHOULD REMOVE THE BANDAGE AND BEGIN DAILY DRESSING CHANGES AS FOLLOWS:     1) Remove Dressing.     2) Clean and dry the area with tap water using a Q-tip or sterile gauze pad.     3) Apply Vaseline, Aquaphor, Polysporin ointment or Bacitracin ointment over entire wound.  Do NOT use Neosporin ointment.     4) Cover the wound with a band-aid, or a sterile non-stick gauze pad and micropore paper tape      REPEAT THESE INSTRUCTIONS AT LEAST ONCE A DAY UNTIL THE WOUND HAS COMPLETELY HEALED.    It is an old wives tale that a wound heals better when it is exposed to air and allowed to dry out. The wound will heal faster with a better cosmetic result if it is kept moist with ointment and covered with a bandage.    **Do not let the wound dry out.**      Supplies Needed:      *Cotton tipped applicators (Q-tips)    *Polysporin Ointment or Bacitracin Ointment (NOT NEOSPORIN)    *Band-aids or non-stick gauze pads and micropore paper tape.      PATIENT INFORMATION:    During the healing process you will notice a number of changes. All wounds develop a small halo of redness surrounding the wound.  This means healing is occurring. Severe itching with extensive redness usually indicates sensitivity to the ointment or bandage tape used to dress the wound.  You should call our office if this develops.      Swelling  and/or discoloration around your surgical site is common, particularly when performed around the eye.    All wounds normally drain.  The larger the wound the more drainage there will be.  After 7-10 days, you will notice the wound beginning to shrink and new skin will begin to grow.  The wound is healed when you can see skin has formed over the entire area.  A healed wound has a healthy, shiny look to the surface and is red to dark pink in color  to normalize.  Wounds may take approximately 4-6 weeks to heal.  Larger wounds may take 6-8 weeks.  After the wound is healed you may discontinue dressing changes.    You may experience a sensation of tightness as your wound heals. This is normal and will gradually subside.    Your healed wound may be sensitive to temperature changes. This sensitivity improves with time, but if you re having a lot of discomfort, try to avoid temperature extremes.    Patients frequently experience itching after their wound appears to have healed because of the continue healing under the skin.  Plain Vaseline will help relieve the itching.        POSSIBLE COMPLICATIONS    BLEEDING:    Leave the bandage in place.  Use tightly rolled up gauze or a cloth to apply direct pressure over the bandage for 30  minutes.  Reapply pressure for an additional 30 minutes if necessary  Use additional gauze and tape to maintain pressure once the bleeding has stopped.

## 2023-06-27 NOTE — LETTER
June 27, 2023    Seema Barens  [address] [facility] [address] [facility] [address]        Dear Seema,     You are scheduled for Mohs Surgery on:     Wednesday July 12 th at 7:45 am.   Wednesday July 26 th at 7:45 am.     Please check in at Dermatology Clinic.   (2nd Floor, last  Clinic on right up staircase or elevator -past OB/GYN clinic)    You don't need to arrive more than 5-10 minutes prior to your appointment time.     Be sure to eat a good breakfast and bathe and wash your hair prior to Surgery.    If you are taking any anti-coagulants that are prescribed by your Doctor (such as Coumadin/warfarin, Plavix, Aspirin, Ibuprofen), please continue taking them.     However, If you are taking anti-coagulants over the counter without  a Doctor's order for a Medical condition, please discontinue them 10 days prior to Surgery.      Please wear loose comfortable clothing as it could possibly be 4-6 hours until your surgery is completed depending upon how many layers of tissue need to be removed.     Wi-fi access is available.     Sincerely,      Abdoulaye Ho MD/ Tatyana Rutledge RN

## 2023-06-27 NOTE — TELEPHONE ENCOUNTER
----- Message from Abdoulaye Ho MD sent at 6/27/2023 12:05 PM CDT -----  R post ear basal cell carcinoma   R post thigh squamous cell carcinoma   Schedule excision

## 2023-06-27 NOTE — PROGRESS NOTES
R post ear (red):Orthokeratosis of epidermis with a proliferation of nests of basaloid cells, with peripheral palisading and a haphazard arrangement in the center extending into the dermis, forming nodules.  The tumor cells have hyperchromatic nuclei. Poor cytoplasm and intercellular bridging.      R post thigh (blue):There is hyperkeratosis & parakeratosis of the epidermis, with full thickness epidermal involvement by atypical keratinocytes with rare pale vacuolated cells invading dermis.      R post ear basal cell carcinoma   R post thigh squamous cell carcinoma   Schedule excision

## 2023-06-27 NOTE — NURSING NOTE
Chief Complaint   Patient presents with     Skin Check     R posterior ear & left posterior leg        There were no vitals filed for this visit.  Wt Readings from Last 1 Encounters:   11/04/22 79.8 kg (176 lb)       Ayanna Martinez LPN .................6/27/2023

## 2023-06-27 NOTE — LETTER
6/27/2023         RE: Seema Barnes  [address] [facility] [address]        Dear Colleague,    Thank you for referring your patient, Seema Barnes, to the [facility]. Please see a copy of my visit note below.    Seema Barnes is an extremely pleasant 78 year old year old female patient here today for sores on right ear and right thigh. Present for months. She notes right ear will scab and right thigh is sore to touch.  Patient has no other skin complaints today.  Remainder of the HPI, Meds, PMH, Allergies, FH, and SH was reviewed in chart.    Pertinent Hx:   No personal history of skin cancer   Past Medical History:   Diagnosis Date     MEDICAL HISTORY OF -     migraines - none since 1995     Pure hypercholesterolemia      Unspecified essential hypertension        Past Surgical History:   Procedure Laterality Date     HC REMOVAL OF TONSILS,<11 Y/O      Tonsils <10y.o.     SURGICAL HISTORY OF -       tubal ligation        Family History   Problem Relation Age of Onset     C.A.D. Mother 50        MI-58     Hypertension Mother      AGUSTÍN.A.NYDIA. Maternal Aunt      C.A.NYDIA. Maternal Uncle      C.A.NYDIA. Sister         angioplasty and stint-age 59     Hypertension Sister      Hypertension Brother      Diabetes No family hx of      Cerebrovascular Disease No family hx of      Breast Cancer No family hx of      Cancer - colorectal No family hx of        Social History     Socioeconomic History     Marital status:      Spouse name: Not on file     Number of children: Not on file     Years of education: Not on file     Highest education level: Not on file   Occupational History     Not on file   Tobacco Use     Smoking status: Never     Smokeless tobacco: Never   Vaping Use     Vaping Use: Never used   Substance and Sexual Activity     Alcohol use: No     Alcohol/week: 0.0 standard drinks of alcohol     Comment: One glass wine every 2-3 months     Drug use: No     Sexual activity:  Not Currently     Partners: Male     Comment:    Other Topics Concern     Parent/sibling w/ CABG, MI or angioplasty before 65F 55M? Yes   Social History Narrative    Dairy/d 3 servings/d. plus a calcium supplement    Caffeine 2-3 servings/d    Exercise 6 x week    Sunscreen used - Not as often as she should    Seatbelts used - Yes    Working smoke/CO detectors in the home - Yes    Guns stored in the home - No    Self Breast Exams - Yes,occ    Self Testicular Exam - NA    Eye Exam up to date - Yes    Dental Exam up to date - Yes    Pap Smear up to date - No    Mammogram up to date - Had appt today    PSA up to date - NA    Dexa Scan up to date - Yes-Sept 2006    Flex Sig / Colonoscopy up to date -Yes-Nov 2007,repeat in 10yrs per pt    Immunizations up to date - yes-td 2007    Abuse: Current or Past(Physical, Sexual or Emotional)- No    Do you feel safe in your environment - Yes     Social Determinants of Health     Financial Resource Strain: Not on file   Food Insecurity: Not on file   Transportation Needs: Not on file   Physical Activity: Not on file   Stress: Not on file   Social Connections: Not on file   Intimate Partner Violence: Not on file   Housing Stability: Not on file       Outpatient Encounter Medications as of 6/27/2023   Medication Sig Dispense Refill     EPINEPHrine (ANY BX GENERIC EQUIV) 0.3 MG/0.3ML injection 2-pack Inject 0.3 mLs (0.3 mg) into the muscle as needed for anaphylaxis May repeat one time in 5-15 minutes if response to initial dose is inadequate. 2 each 1     metoprolol succinate ER (TOPROL XL) 25 MG 24 hr tablet Take 0.5 tablets (12.5 mg) by mouth daily 45 tablet 3     ondansetron (ZOFRAN ODT) 4 MG ODT tab Take 1 tablet (4 mg) by mouth every 8 hours as needed for nausea 9 tablet 0     simvastatin (ZOCOR) 20 MG tablet Take 1 tablet (20 mg) by mouth At Bedtime 90 tablet 3     No facility-administered encounter medications on file as of 6/27/2023.             O:   NAD, WDWN, Alert &  Oriented, Mood & Affect wnl, Vitals stable   Here today alone   There were no vitals taken for this visit.   General appearance normal   Vitals stable   Alert, oriented and in no acute distress     1.0 cm pink scabbed plaque on right posterior ear  0.6 cm pink scaly papule on right posterior thigh  Brown stuck on papule on left lateral canthus   Stuck on papules and brown macules on trunk and ext   Red papules on trunk    The remainder of skin exam is normal     Eyes: Conjunctivae/lids:Normal     ENT: Lips: normal    MSK:Normal    Cardiovascular: peripheral edema none    Pulm: Breathing Normal    Neuro/Psych: Orientation:Alert and Orientedx3 ; Mood/Affect:normal   A/P:  1. R/O NMSC on right posterior ear, right posterior thigh  TANGENTIAL BIOPSY IN HOUSE:  After consent, anesthesia with LEC and prep, tangential excision performed.  No complications and routine wound care.      2. Inflamed seborrheic keratosis on left lateral canthus  LN2:  Treated with LN2 for 5s for 1-2 cycles. Warned risks of blistering, pain, pigment change, scarring, and incomplete resolution.  Advised patient to return if lesions do not completely resolve.  Wound care sheet given.  3. Seborrheic keratosis, lentigo, angioma  It was a pleasure speaking to Seema Barnes today.  BENIGN LESIONS DISCUSSED WITH PATIENT:  I discussed the specifics of tumor, prognosis, and genetics of benign lesions.  I explained that treatment of these lesions would be purely cosmetic and not medically neccessary.  I discussed with patient different removal options including excision, cautery and /or laser.      Nature and genetics of benign skin lesions dicussed with patient.  Signs and Symptoms of skin cancer discussed with patient.  ABCDEs of melanoma reviewed with patient.  Patient encouraged to perform monthly skin exams.  UV precautions reviewed with patient.  Risks of non-melanoma skin cancer discussed with patient   Return to clinic pending biopsy results.          Again, thank you for allowing me to participate in the care of your patient.        Sincerely,        Kylie Alcantara PA-C

## 2023-06-27 NOTE — PROGRESS NOTES
Seema Barnes is an extremely pleasant 78 year old year old female patient here today for sores on right ear and right thigh. Present for months. She notes right ear will scab and right thigh is sore to touch.  Patient has no other skin complaints today.  Remainder of the HPI, Meds, PMH, Allergies, FH, and SH was reviewed in chart.    Pertinent Hx:   No personal history of skin cancer   Past Medical History:   Diagnosis Date     MEDICAL HISTORY OF -     migraines - none since 1995     Pure hypercholesterolemia      Unspecified essential hypertension        Past Surgical History:   Procedure Laterality Date     HC REMOVAL OF TONSILS,<9 Y/O      Tonsils <10y.o.     SURGICAL HISTORY OF -       tubal ligation        Family History   Problem Relation Age of Onset     C.A.D. Mother 50        MI-58     Hypertension Mother      C.A.NYDIA. Maternal Aunt      C.A.NYDIA. Maternal Uncle      C.A.NYDIA. Sister         angioplasty and stint-age 59     Hypertension Sister      Hypertension Brother      Diabetes No family hx of      Cerebrovascular Disease No family hx of      Breast Cancer No family hx of      Cancer - colorectal No family hx of        Social History     Socioeconomic History     Marital status:      Spouse name: Not on file     Number of children: Not on file     Years of education: Not on file     Highest education level: Not on file   Occupational History     Not on file   Tobacco Use     Smoking status: Never     Smokeless tobacco: Never   Vaping Use     Vaping Use: Never used   Substance and Sexual Activity     Alcohol use: No     Alcohol/week: 0.0 standard drinks of alcohol     Comment: One glass wine every 2-3 months     Drug use: No     Sexual activity: Not Currently     Partners: Male     Comment:    Other Topics Concern     Parent/sibling w/ CABG, MI or angioplasty before 65F 55M? Yes   Social History Narrative    Dairy/d 3 servings/d. plus a calcium supplement    Caffeine 2-3 servings/d     Exercise 6 x week    Sunscreen used - Not as often as she should    Seatbelts used - Yes    Working smoke/CO detectors in the home - Yes    Guns stored in the home - No    Self Breast Exams - Yes,occ    Self Testicular Exam - NA    Eye Exam up to date - Yes    Dental Exam up to date - Yes    Pap Smear up to date - No    Mammogram up to date - Had appt today    PSA up to date - NA    Dexa Scan up to date - Yes-Sept 2006    Flex Sig / Colonoscopy up to date -Yes-Nov 2007,repeat in 10yrs per pt    Immunizations up to date - yes-td 2007    Abuse: Current or Past(Physical, Sexual or Emotional)- No    Do you feel safe in your environment - Yes     Social Determinants of Health     Financial Resource Strain: Not on file   Food Insecurity: Not on file   Transportation Needs: Not on file   Physical Activity: Not on file   Stress: Not on file   Social Connections: Not on file   Intimate Partner Violence: Not on file   Housing Stability: Not on file       Outpatient Encounter Medications as of 6/27/2023   Medication Sig Dispense Refill     EPINEPHrine (ANY BX GENERIC EQUIV) 0.3 MG/0.3ML injection 2-pack Inject 0.3 mLs (0.3 mg) into the muscle as needed for anaphylaxis May repeat one time in 5-15 minutes if response to initial dose is inadequate. 2 each 1     metoprolol succinate ER (TOPROL XL) 25 MG 24 hr tablet Take 0.5 tablets (12.5 mg) by mouth daily 45 tablet 3     ondansetron (ZOFRAN ODT) 4 MG ODT tab Take 1 tablet (4 mg) by mouth every 8 hours as needed for nausea 9 tablet 0     simvastatin (ZOCOR) 20 MG tablet Take 1 tablet (20 mg) by mouth At Bedtime 90 tablet 3     No facility-administered encounter medications on file as of 6/27/2023.             O:   NAD, WDWN, Alert & Oriented, Mood & Affect wnl, Vitals stable   Here today alone   There were no vitals taken for this visit.   General appearance normal   Vitals stable   Alert, oriented and in no acute distress     1.0 cm pink scabbed plaque on right posterior  ear  0.6 cm pink scaly papule on right posterior thigh  Brown stuck on papule on left lateral canthus   Stuck on papules and brown macules on trunk and ext   Red papules on trunk    The remainder of skin exam is normal     Eyes: Conjunctivae/lids:Normal     ENT: Lips: normal    MSK:Normal    Cardiovascular: peripheral edema none    Pulm: Breathing Normal    Neuro/Psych: Orientation:Alert and Orientedx3 ; Mood/Affect:normal   A/P:  1. R/O NMSC on right posterior ear, right posterior thigh  TANGENTIAL BIOPSY IN HOUSE:  After consent, anesthesia with LEC and prep, tangential excision performed.  No complications and routine wound care.      2. Inflamed seborrheic keratosis on left lateral canthus  LN2:  Treated with LN2 for 5s for 1-2 cycles. Warned risks of blistering, pain, pigment change, scarring, and incomplete resolution.  Advised patient to return if lesions do not completely resolve.  Wound care sheet given.  3. Seborrheic keratosis, lentigo, angioma  It was a pleasure speaking to Seema Barnes today.  BENIGN LESIONS DISCUSSED WITH PATIENT:  I discussed the specifics of tumor, prognosis, and genetics of benign lesions.  I explained that treatment of these lesions would be purely cosmetic and not medically neccessary.  I discussed with patient different removal options including excision, cautery and /or laser.      Nature and genetics of benign skin lesions dicussed with patient.  Signs and Symptoms of skin cancer discussed with patient.  ABCDEs of melanoma reviewed with patient.  Patient encouraged to perform monthly skin exams.  UV precautions reviewed with patient.  Risks of non-melanoma skin cancer discussed with patient   Return to clinic pending biopsy results.

## 2023-07-11 NOTE — PROGRESS NOTES
Surgical Office Location :   [facility]  [address] [place] [address]

## 2023-07-12 ENCOUNTER — OFFICE VISIT (OUTPATIENT)
Dept: DERMATOLOGY | Facility: CLINIC | Age: 78
End: 2023-07-12
Payer: COMMERCIAL

## 2023-07-12 VITALS — OXYGEN SATURATION: 96 % | DIASTOLIC BLOOD PRESSURE: 82 MMHG | HEART RATE: 96 BPM | SYSTOLIC BLOOD PRESSURE: 138 MMHG

## 2023-07-12 DIAGNOSIS — C44.722 SQUAMOUS CELL CARCINOMA OF RIGHT LOWER LEG: Primary | ICD-10-CM

## 2023-07-12 PROCEDURE — 17313 MOHS 1 STAGE T/A/L: CPT | Performed by: DERMATOLOGY

## 2023-07-12 ASSESSMENT — PAIN SCALES - GENERAL: PAINLEVEL: NO PAIN (0)

## 2023-07-12 NOTE — LETTER
7/12/2023         RE: Seema Barnes  [address] [facility] [address]        Dear Colleague,    Thank you for referring your patient, Seema Barnes, to the [facility]. Please see a copy of my visit note below.    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  Patient has no other skin complaints today.  Remainder of the HPI, Meds, PMH, Allergies, FH, and SH was reviewed in chart.      Past Medical History:   Diagnosis Date     MEDICAL HISTORY OF -     migraines - none since 1995     Pure hypercholesterolemia      Squamous cell carcinoma of skin, unspecified      Unspecified essential hypertension        Past Surgical History:   Procedure Laterality Date     HC REMOVAL OF TONSILS,<9 Y/O      Tonsils <10y.o.     SURGICAL HISTORY OF -       tubal ligation        Family History   Problem Relation Age of Onset     C.A.D. Mother 50        MI-58     Hypertension Mother      AGUSTÍN.A.NYDIA. Maternal Aunt      C.A.NYDIA. Maternal Uncle      C.A.NYDIA. Sister         angioplasty and stint-age 59     Hypertension Sister      Hypertension Brother      Diabetes No family hx of      Cerebrovascular Disease No family hx of      Breast Cancer No family hx of      Cancer - colorectal No family hx of        Social History     Socioeconomic History     Marital status:      Spouse name: Not on file     Number of children: Not on file     Years of education: Not on file     Highest education level: Not on file   Occupational History     Not on file   Tobacco Use     Smoking status: Never     Smokeless tobacco: Never   Vaping Use     Vaping Use: Never used   Substance and Sexual Activity     Alcohol use: No     Alcohol/week: 0.0 standard drinks of alcohol     Comment: One glass wine  every 2-3 months     Drug use: No     Sexual activity: Not Currently     Partners: Male     Comment:    Other Topics Concern     Parent/sibling w/ CABG, MI or angioplasty before 65F 55M? Yes   Social History Narrative    Dairy/d 3 servings/d. plus a calcium supplement    Caffeine 2-3 servings/d    Exercise 6 x week    Sunscreen used - Not as often as she should    Seatbelts used - Yes    Working smoke/CO detectors in the home - Yes    Guns stored in the home - No    Self Breast Exams - Yes,occ    Self Testicular Exam - NA    Eye Exam up to date - Yes    Dental Exam up to date - Yes    Pap Smear up to date - No    Mammogram up to date - Had appt today    PSA up to date - NA    Dexa Scan up to date - Yes-Sept 2006    Flex Sig / Colonoscopy up to date -Yes-Nov 2007,repeat in 10yrs per pt    Immunizations up to date - yes-td 2007    Abuse: Current or Past(Physical, Sexual or Emotional)- No    Do you feel safe in your environment - Yes     Social Determinants of Health     Financial Resource Strain: Not on file   Food Insecurity: Not on file   Transportation Needs: Not on file   Physical Activity: Not on file   Stress: Not on file   Social Connections: Not on file   Intimate Partner Violence: Not on file   Housing Stability: Not on file       Outpatient Encounter Medications as of 7/12/2023   Medication Sig Dispense Refill     EPINEPHrine (ANY BX GENERIC EQUIV) 0.3 MG/0.3ML injection 2-pack Inject 0.3 mLs (0.3 mg) into the muscle as needed for anaphylaxis May repeat one time in 5-15 minutes if response to initial dose is inadequate. 2 each 1     metoprolol succinate ER (TOPROL XL) 25 MG 24 hr tablet Take 0.5 tablets (12.5 mg) by mouth daily 45 tablet 3     ondansetron (ZOFRAN ODT) 4 MG ODT tab Take 1 tablet (4 mg) by mouth every 8 hours as needed for nausea 9 tablet 0     simvastatin (ZOCOR) 20 MG tablet Take 1 tablet (20 mg) by mouth At Bedtime 90 tablet 3     No facility-administered encounter medications on  file as of 7/12/2023.             O:   NAD, WDWN, Alert & Oriented, Mood & Affect wnl, Vitals stable   Here today alone   /82   Pulse 96   SpO2 96%    General appearance normal   Vitals stable   Alert, oriented and in no acute distress   R post thigh 6mm scaly papule       Eyes: Conjunctivae/lids:Normal     ENT: Lips, buccal mucosa, tongue: normal    MSK:Normal    Cardiovascular: peripheral edema none    Pulm: Breathing Normal    Neuro/Psych: Orientation:Alert and Orientedx3 ; Mood/Affect:normal       A/P:  R post thigh squamous cell carcinoma   MOHS:   Size    The rationale for Mohs surgery was discussed with the patient and consent was obtained.  The risks and benefits as well as alternatives to therapy were discussed, in detail.  Specifically, the risks of infection, scarring, bleeding, prolonged wound healing, incomplete removal, allergy to anesthesia, nerve injury and recurrence were addressed.  Indication for Mohs was Size. Prior to the procedure, the treatment site was clearly identified and, if available, confirmed with previous photos and confirmed by the patient   All components of the Universal Protocol/PAUSE rule were completed.  The Mohs surgeon operated in two distinct and integrated capacities as the surgeon and pathologist.      The area was prepped with Betasept.  A rim of normal appearing skin was marked circumferentially around the lesion.  The area was infiltrated with local anesthesia.  The tumor was first debulked to remove all clinically apparent tumor.  An incision following the standard Mohs approach was done and the specimen was oriented,mapped and placed in 1 block(s).  Each specimen was then chromacoded and processed in the Mohs laboratory using standard Mohs technique and submitted for frozen section histology.  Frozen section analysis showed no residual tumor but CLEAR MARGINS.      The tumor was excised using standard Mohs technique in 1 stages(s).  CLEAR MARGINS OBTAINED and  Final defect size was 1.2cm.     We discussed the options for wound management in full with the patient including risks/benefits/ possible outcomes.    .  REPAIR SECOND INTENT: We discussed the options for wound management in full with the patient including risks/benefits/possible outcomes. Decision made to allow the wound to heal by second intention. Cautery was used for for hemostasis. EBL minimal; complications none; wound care routine.  The patient was discharged in good condition and will return in one month or prn for wound evaluation.   It was a pleasure speaking to Seema Barnes today.  Previous clinic notes and pertinent laboratory tests were reviewed prior to Seema Barnes's visit.  Signs and Symptoms of skin cancer discussed with patient.  Patient encouraged to perform monthly skin exams.  UV precautions reviewed with patient.  Risks of non-melanoma skin cancer discussed with patient   Return to clinic next appt        Again, thank you for allowing me to participate in the care of your patient.        Sincerely,        Abdoulaye Ho MD

## 2023-07-12 NOTE — PATIENT INSTRUCTIONS
Open Wound Care     for ___right thigh___________        No strenuous activity for 48 hours    Take Tylenol as needed for discomfort.                                                .         Do not drink alcoholic beverages for 48 hours.    Keep the pressure bandage in place for 24 hours. If the bandage becomes blood tinged or loose, reinforce it with gauze and tape.        (Refer to the reverse side of this page for management of bleeding).    Remove bandage in 24 hours and begin wound care as follows:     Clean area with tap water using a Q tip or gauze pad, (shower / bathe normally)  Dry wound with Q tip or gauze pad  Apply Aquaphor, Vaseline, Polysporin or Bacitracin Ointment with a Q tip  Do NOT use Neosporin Ointment *  Cover the wound with a band-aid or nonstick gauze pad and paper tape.  Repeat wound care once a day until wound is completely healed.    It is an old wives tale that a wound heals better when it is exposed to air and allowed to dry out. The wound will heal faster with a better cosmetic result if it is kept moist with ointment and covered with a bandage.  Do not let the wound dry out.      Supplies Needed:                Qtips or gauze pads                Polysporin or Bacitracin Ointment                Bandaids or nonstick gauze pads and paper tape    Wound care kits and brown paper tape are available for purchase at   the pharmacy.    BLEEDING:    Use tightly rolled up gauze or cloth to apply direct pressure over the bandage for 20   minutes.  Reapply pressure for an additional 20 minutes if necessary  Call the office or go to the nearest emergency room if pressure fails to stop the bleeding.  Use additional gauze and tape to maintain pressure once the bleeding has stopped.  Begin wound care 24 hours after surgery as directed.                  WOUND HEALING    One week after surgery a pink / red halo will form around the outside of the wound.   This is new skin.  The center of the wound will  appear yellowish white and produce some drainage.  The pink halo will slowly migrate in toward the center of the wound until the wound is covered with new shiny pink skin.  There will be no more drainage when the wound is completely healed.  It will take six months to one year for the redness to fade.  The scar may be itchy, tight and sensitive to extreme temperatures for a year after the surgery.  Massaging the area several times a day for several minutes after the wound is completely healed will help the scar soften and normalize faster. Begin massage only after healing is complete.      In case of emergency call: Dr Ho: [phone]    [facility]: [phone]    [facility]:[phone]         Proper skin care from [facility]:    -Eliminate harsh soaps as they strip the natural oils from the skin, often resulting in dry itchy skin ( i.e. Dial, Zest, Maria Luz Spring)  -Use mild soaps such as Cetaphil or Dove Sensitive Skin in the shower. You do not need to use soap on arms, legs, and trunk every time you shower unless visibly soiled.   -Avoid hot or cold showers.  -After showering, lightly dry off and apply moisturizer within 2-3 minutes. This will help trap moisture in the skin.   -Aggressive use of a moisturizer at least 1-2 times a day to the entire body (including -Vanicream, Cetaphil, Aquaphor or Cerave) and moisturize hands after every washing.  -We recommend using moisturizers that come in a tub that needs to be scooped out, not a pump. This has more of an oil base. It will hold moisture in your skin much better than a water base moisturizer. The above recommended are non-pore clogging.      Wear a sunscreen with at least SPF 30 on your face, ears, neck and V of the chest daily. Wear sunscreen on other areas of the body if those areas are exposed to the sun throughout the day. Sunscreens can contain physical and/or chemical blockers. Physical blockers are less likely to clog  pores, these include zinc oxide and titanium dioxide. Reapply every two hour and after swimming.     Sunscreen examples: https://www.ewg.org/sunscreen/    UV radiation  UVA radiation remains constant throughout the day and throughout the year. It is a longer wavelength than UVB and therefore penetrates deeper into the skin leading to immediate and delayed tanning, photoaging, and skin cancer. 70-80% of UVA and UVB radiation occurs between the hours of 10am-2pm.  UVB radiation  UVB radiation causes the most harmful effects and is more significant during the summer months. However, snow and ice can reflect UVB radiation leading to skin damage during the winter months as well. UVB radiation is responsible for tanning, burning, inflammation, delayed erythema (pinkness), pigmentation (brown spots), and skin cancer.     I recommend self monthly full body exams and yearly full body exams with a dermatology provider. If you develop a new or changing lesion please follow up for examination. Most skin cancers are pink and scaly or pink and pearly. However, we do see blue/brown/black skin cancers.  Consider the ABCDEs of melanoma when giving yourself your monthly full body exam ( don't forget the groin, buttocks, feet, toes, etc). A-asymmetry, B-borders, C-color, D-diameter, E-elevation or evolving. If you see any of these changes please follow up in clinic. If you cannot see your back I recommend purchasing a hand held mirror to use with a larger wall mirror.

## 2023-07-12 NOTE — NURSING NOTE
Seema Barnes's chief complaint for this visit includes:  Chief Complaint   Patient presents with     Derm Problem     Mohs- right thigh     PCP: Nat Houston    Referring Provider:  No referring provider defined for this encounter.    There were no vitals taken for this visit.  No Pain (0)        Allergies   Allergen Reactions     Wasps [Hornets] Anaphylaxis     No Known Drug Allergy          Do you need any medication refills at today's visit? No    Sydney Valdovinos MA

## 2023-07-12 NOTE — PROGRESS NOTES
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  Patient has no other skin complaints today.  Remainder of the HPI, Meds, PMH, Allergies, FH, and SH was reviewed in chart.      Past Medical History:   Diagnosis Date    MEDICAL HISTORY OF -     migraines - none since 1995    Pure hypercholesterolemia     Squamous cell carcinoma of skin, unspecified     Unspecified essential hypertension        Past Surgical History:   Procedure Laterality Date    HC REMOVAL OF TONSILS,<9 Y/O      Tonsils <10y.o.    SURGICAL HISTORY OF -       tubal ligation        Family History   Problem Relation Age of Onset    C.A.D. Mother 50        MI-58    Hypertension Mother     C.A.D. Maternal Aunt     C.A.D. Maternal Uncle     C.A.D. Sister         angioplasty and stint-age 59    Hypertension Sister     Hypertension Brother     Diabetes No family hx of     Cerebrovascular Disease No family hx of     Breast Cancer No family hx of     Cancer - colorectal No family hx of        Social History     Socioeconomic History    Marital status:      Spouse name: Not on file    Number of children: Not on file    Years of education: Not on file    Highest education level: Not on file   Occupational History    Not on file   Tobacco Use    Smoking status: Never    Smokeless tobacco: Never   Vaping Use    Vaping Use: Never used   Substance and Sexual Activity    Alcohol use: No     Alcohol/week: 0.0 standard drinks of alcohol     Comment: One glass wine every 2-3 months    Drug use: No    Sexual activity: Not Currently     Partners: Male     Comment:    Other Topics Concern    Parent/sibling w/ CABG, MI or angioplasty before 65F 55M? Yes   Social History Narrative    Dairy/d 3 servings/d. plus a calcium supplement    Caffeine 2-3 servings/d    Exercise 6 x week    Sunscreen used - Not as often as she should    Seatbelts used  - Yes    Working smoke/CO detectors in the home - Yes    Guns stored in the home - No    Self Breast Exams - Yes,occ    Self Testicular Exam - NA    Eye Exam up to date - Yes    Dental Exam up to date - Yes    Pap Smear up to date - No    Mammogram up to date - Had appt today    PSA up to date - NA    Dexa Scan up to date - Yes-Sept 2006    Flex Sig / Colonoscopy up to date -Yes-Nov 2007,repeat in 10yrs per pt    Immunizations up to date - yes-td 2007    Abuse: Current or Past(Physical, Sexual or Emotional)- No    Do you feel safe in your environment - Yes     Social Determinants of Health     Financial Resource Strain: Not on file   Food Insecurity: Not on file   Transportation Needs: Not on file   Physical Activity: Not on file   Stress: Not on file   Social Connections: Not on file   Intimate Partner Violence: Not on file   Housing Stability: Not on file       Outpatient Encounter Medications as of 7/12/2023   Medication Sig Dispense Refill    EPINEPHrine (ANY BX GENERIC EQUIV) 0.3 MG/0.3ML injection 2-pack Inject 0.3 mLs (0.3 mg) into the muscle as needed for anaphylaxis May repeat one time in 5-15 minutes if response to initial dose is inadequate. 2 each 1    metoprolol succinate ER (TOPROL XL) 25 MG 24 hr tablet Take 0.5 tablets (12.5 mg) by mouth daily 45 tablet 3    ondansetron (ZOFRAN ODT) 4 MG ODT tab Take 1 tablet (4 mg) by mouth every 8 hours as needed for nausea 9 tablet 0    simvastatin (ZOCOR) 20 MG tablet Take 1 tablet (20 mg) by mouth At Bedtime 90 tablet 3     No facility-administered encounter medications on file as of 7/12/2023.             O:   NAD, WDWN, Alert & Oriented, Mood & Affect wnl, Vitals stable   Here today alone   /82   Pulse 96   SpO2 96%    General appearance normal   Vitals stable   Alert, oriented and in no acute distress   R post thigh 6mm scaly papule       Eyes: Conjunctivae/lids:Normal     ENT: Lips, buccal mucosa, tongue: normal    MSK:Normal    Cardiovascular:  peripheral edema none    Pulm: Breathing Normal    Neuro/Psych: Orientation:Alert and Orientedx3 ; Mood/Affect:normal       A/P:  R post thigh squamous cell carcinoma   MOHS:   Size    The rationale for Mohs surgery was discussed with the patient and consent was obtained.  The risks and benefits as well as alternatives to therapy were discussed, in detail.  Specifically, the risks of infection, scarring, bleeding, prolonged wound healing, incomplete removal, allergy to anesthesia, nerve injury and recurrence were addressed.  Indication for Mohs was Size. Prior to the procedure, the treatment site was clearly identified and, if available, confirmed with previous photos and confirmed by the patient   All components of the Universal Protocol/PAUSE rule were completed.  The Mohs surgeon operated in two distinct and integrated capacities as the surgeon and pathologist.      The area was prepped with Betasept.  A rim of normal appearing skin was marked circumferentially around the lesion.  The area was infiltrated with local anesthesia.  The tumor was first debulked to remove all clinically apparent tumor.  An incision following the standard Mohs approach was done and the specimen was oriented,mapped and placed in 1 block(s).  Each specimen was then chromacoded and processed in the Mohs laboratory using standard Mohs technique and submitted for frozen section histology.  Frozen section analysis showed no residual tumor but CLEAR MARGINS.      The tumor was excised using standard Mohs technique in 1 stages(s).  CLEAR MARGINS OBTAINED and Final defect size was 1.2cm.     We discussed the options for wound management in full with the patient including risks/benefits/ possible outcomes.    .  REPAIR SECOND INTENT: We discussed the options for wound management in full with the patient including risks/benefits/possible outcomes. Decision made to allow the wound to heal by second intention. Cautery was used for for hemostasis.  EBL minimal; complications none; wound care routine.  The patient was discharged in good condition and will return in one month or prn for wound evaluation.   It was a pleasure speaking to Seema Barnes today.  Previous clinic notes and pertinent laboratory tests were reviewed prior to Seema Barnes's visit.  Signs and Symptoms of skin cancer discussed with patient.  Patient encouraged to perform monthly skin exams.  UV precautions reviewed with patient.  Risks of non-melanoma skin cancer discussed with patient   Return to clinic next appt

## 2023-07-26 ENCOUNTER — OFFICE VISIT (OUTPATIENT)
Dept: DERMATOLOGY | Facility: CLINIC | Age: 78
End: 2023-07-26
Payer: COMMERCIAL

## 2023-07-26 VITALS — DIASTOLIC BLOOD PRESSURE: 79 MMHG | HEART RATE: 71 BPM | SYSTOLIC BLOOD PRESSURE: 121 MMHG

## 2023-07-26 DIAGNOSIS — C44.212 BASAL CELL CARCINOMA (BCC) OF RIGHT EAR: Primary | ICD-10-CM

## 2023-07-26 PROCEDURE — 17311 MOHS 1 STAGE H/N/HF/G: CPT | Performed by: DERMATOLOGY

## 2023-07-26 PROCEDURE — 17312 MOHS ADDL STAGE: CPT | Performed by: DERMATOLOGY

## 2023-07-26 PROCEDURE — 13152 CMPLX RPR E/N/E/L 2.6-7.5 CM: CPT | Performed by: DERMATOLOGY

## 2023-07-26 ASSESSMENT — PAIN SCALES - GENERAL: PAINLEVEL: NO PAIN (0)

## 2023-07-26 NOTE — PATIENT INSTRUCTIONS
Open Wound Care     For middle of ear        No strenuous activity for 48 hours    Take Tylenol as needed for discomfort.                                                .         Do not drink alcoholic beverages for 48 hours.    Keep the pressure bandage in place for 24 hours. If the bandage becomes blood tinged or loose, reinforce it with gauze and tape.        (Refer to the reverse side of this page for management of bleeding).    Remove bandage in 24 hours and begin wound care as follows:     Clean area with tap water using a Q tip or gauze pad, (shower / bathe normally)  Dry wound with Q tip or gauze pad  Apply Aquaphor, Vaseline, Polysporin or Bacitracin Ointment with a Q tip  Do NOT use Neosporin Ointment *  Cover the wound with a band-aid or nonstick gauze pad and paper tape.  Repeat wound care once a day until wound is completely healed.    It is an old wives tale that a wound heals better when it is exposed to air and allowed to dry out. The wound will heal faster with a better cosmetic result if it is kept moist with ointment and covered with a bandage.  Do not let the wound dry out.      Supplies Needed:                Qtips or gauze pads                Polysporin or Bacitracin Ointment                Bandaids or nonstick gauze pads and paper tape    Wound care kits and brown paper tape are available for purchase at   the pharmacy.    BLEEDING:    Use tightly rolled up gauze or cloth to apply direct pressure over the bandage for 20   minutes.  Reapply pressure for an additional 20 minutes if necessary  Call the office or go to the nearest emergency room if pressure fails to stop the bleeding.  Use additional gauze and tape to maintain pressure once the bleeding has stopped.  Begin wound care 24 hours after surgery as directed.                  WOUND HEALING    One week after surgery a pink / red halo will form around the outside of the wound.   This is new skin.  The center of the wound will appear  yellowish white and produce some drainage.  The pink halo will slowly migrate in toward the center of the wound until the wound is covered with new shiny pink skin.  There will be no more drainage when the wound is completely healed.  It will take six months to one year for the redness to fade.  The scar may be itchy, tight and sensitive to extreme temperatures for a year after the surgery.  Massaging the area several times a day for several minutes after the wound is completely healed will help the scar soften and normalize faster. Begin massage only after healing is complete.  The top and bottom edge of your ear has steri strips with brown paper tape. The bottom layer of sutures will dissolve in 6+ months. If possible try to keep tape on for a week if the tape gets soiled reinforce with band aid.       In case of emergency call: Dr Ho: [phone]    [facility]: [phone]    [facility]:[phone]

## 2023-07-26 NOTE — PROGRESS NOTES
Seema Barnes is an extremely pleasant 78 year old year old female patient here today for evaluation and managment of basal cell carcinoma on right post ear.  Patient has no other skin complaints today.  Remainder of the HPI, Meds, PMH, Allergies, FH, and SH was reviewed in chart.      Past Medical History:   Diagnosis Date    MEDICAL HISTORY OF -     migraines - none since 1995    Pure hypercholesterolemia     Squamous cell carcinoma of skin, unspecified     Unspecified essential hypertension        Past Surgical History:   Procedure Laterality Date    HC REMOVAL OF TONSILS,<11 Y/O      Tonsils <10y.o.    SURGICAL HISTORY OF -       tubal ligation        Family History   Problem Relation Age of Onset    C.A.D. Mother 50        MI-58    Hypertension Mother     C.A.D. Maternal Aunt     C.A.D. Maternal Uncle     C.A.D. Sister         angioplasty and stint-age 59    Hypertension Sister     Hypertension Brother     Diabetes No family hx of     Cerebrovascular Disease No family hx of     Breast Cancer No family hx of     Cancer - colorectal No family hx of        Social History     Socioeconomic History    Marital status:      Spouse name: Not on file    Number of children: Not on file    Years of education: Not on file    Highest education level: Not on file   Occupational History    Not on file   Tobacco Use    Smoking status: Never    Smokeless tobacco: Never   Vaping Use    Vaping Use: Never used   Substance and Sexual Activity    Alcohol use: No     Alcohol/week: 0.0 standard drinks of alcohol     Comment: One glass wine every 2-3 months    Drug use: No    Sexual activity: Not Currently     Partners: Male     Comment:    Other Topics Concern    Parent/sibling w/ CABG, MI or angioplasty before 65F 55M? Yes   Social History Narrative    Dairy/d 3 servings/d. plus a calcium supplement    Caffeine 2-3 servings/d    Exercise 6 x week    Sunscreen used - Not as often as she should    Seatbelts used -  Yes    Working smoke/CO detectors in the home - Yes    Guns stored in the home - No    Self Breast Exams - Yes,occ    Self Testicular Exam - NA    Eye Exam up to date - Yes    Dental Exam up to date - Yes    Pap Smear up to date - No    Mammogram up to date - Had appt today    PSA up to date - NA    Dexa Scan up to date - Yes-Sept 2006    Flex Sig / Colonoscopy up to date -Yes-Nov 2007,repeat in 10yrs per pt    Immunizations up to date - yes-td 2007    Abuse: Current or Past(Physical, Sexual or Emotional)- No    Do you feel safe in your environment - Yes     Social Determinants of Health     Financial Resource Strain: Not on file   Food Insecurity: Not on file   Transportation Needs: Not on file   Physical Activity: Not on file   Stress: Not on file   Social Connections: Not on file   Intimate Partner Violence: Not on file   Housing Stability: Not on file       Outpatient Encounter Medications as of 7/26/2023   Medication Sig Dispense Refill    EPINEPHrine (ANY BX GENERIC EQUIV) 0.3 MG/0.3ML injection 2-pack Inject 0.3 mLs (0.3 mg) into the muscle as needed for anaphylaxis May repeat one time in 5-15 minutes if response to initial dose is inadequate. 2 each 1    metoprolol succinate ER (TOPROL XL) 25 MG 24 hr tablet Take 0.5 tablets (12.5 mg) by mouth daily 45 tablet 3    ondansetron (ZOFRAN ODT) 4 MG ODT tab Take 1 tablet (4 mg) by mouth every 8 hours as needed for nausea 9 tablet 0    simvastatin (ZOCOR) 20 MG tablet Take 1 tablet (20 mg) by mouth At Bedtime 90 tablet 3     No facility-administered encounter medications on file as of 7/26/2023.             O:   NAD, WDWN, Alert & Oriented, Mood & Affect wnl, Vitals stable   Here today alone   /79   Pulse 71    General appearance normal   Vitals stable   Alert, oriented and in no acute distress   R POST EAR 1CM PINK PEARLY PAPULE       Eyes: Conjunctivae/lids:Normal     ENT: Lips, buccal mucosa, tongue: normal    MSK:Normal    Cardiovascular: peripheral  edema none    Pulm: Breathing Normal    Lymph Nodes: No Head and Neck Lymphadenopathy     Neuro/Psych: Orientation:Alert and Orientedx3 ; Mood/Affect:normal       A/P:  R post ear basal cell carcinoma   MOHS:   Location    The rationale for Mohs surgery was discussed with the patient and consent was obtained.  The risks and benefits as well as alternatives to therapy were discussed, in detail.  Specifically, the risks of infection, scarring, bleeding, prolonged wound healing, incomplete removal, allergy to anesthesia, nerve injury and recurrence were addressed.  Indication for Mohs was Location. Prior to the procedure, the treatment site was clearly identified and, if available, confirmed with previous photos and confirmed by the patient   All components of the Universal Protocol/PAUSE rule were completed.  The Mohs surgeon operated in two distinct and integrated capacities as the surgeon and pathologist.      The area was prepped with Betasept.  A rim of normal appearing skin was marked circumferentially around the lesion.  The area was infiltrated with local anesthesia.  The tumor was first debulked to remove all clinically apparent tumor.  An incision following the standard Mohs approach was done and the specimen was oriented,mapped and placed in 4 block(s).  Each specimen was then chromacoded and processed in the Mohs laboratory using standard Mohs technique and submitted for frozen section histology.  Frozen section analysis showed residual tumor but CLEAR MARGINS.    1st and 2nd stage:Orthokeratosis of epidermis with a proliferation of nests of basaloid cells, with peripheral palisading and a haphazard arrangement in the center extending into the dermis, forming nodules.  The tumor cells have hyperchromatic nuclei. Poor cytoplasm and intercellular bridging.      The tumor was excised using standard Mohs technique in 3 stages(s).  CLEAR MARGINS OBTAINED and Final defect size was 2.5 x 2 cm.     We discussed the  options for wound management in full with the patient including risks/benefits/ possible outcomes.    .  REPAIR COMPLEX: Because of the tightness of the surrounding skin and Because of the size and full thickness nature of the defect, Because of the tightness of the surrounding skin, To maintain form and function, In order to avoid distortion, and Because of the proximity to the ear, a complex closure was planned. After LE anesthesia and prep, Burow's triangles were excised in the relaxed skin tension lines. The wound edges were widely undermined greater than width of the defect on both sides by dissection in the subcutaneous plane until adequate tissue mobility was obtained. Hemostasis was obtained. The wound edges were closed in a layered fashion using Vicryl and Fast Absorbing Plain Gut sutures. Postoperative length was 3 cm.   EBL minimal; complications none; wound care routine.  The patient was discharged in good condition and will return in one week for wound evaluation.  It was a pleasure speaking to Seema Barnes today.  Previous clinic notes and pertinent laboratory tests were reviewed prior to Seema Barnes's visit.  Signs and Symptoms of skin cancer discussed with patient.  Patient encouraged to perform monthly skin exams.  UV precautions reviewed with patient.  Risks of non-melanoma skin cancer discussed with patient   Return to clinic 6 months

## 2023-07-26 NOTE — LETTER
7/26/2023         RE: Seema Barnes  [address] [facility] [address]        Dear Colleague,    Thank you for referring your patient, Seema Barnes, to the [facility]. Please see a copy of my visit note below.    Surgical Office Location :   [facility]  [address] [place] [address]      Seema Barnes is an extremely pleasant 78 year old year old female patient here today for evaluation and managment of basal cell carcinoma on right post ear.  Patient has no other skin complaints today.  Remainder of the HPI, Meds, PMH, Allergies, FH, and SH was reviewed in chart.      Past Medical History:   Diagnosis Date     MEDICAL HISTORY OF -     migraines - none since 1995     Pure hypercholesterolemia      Squamous cell carcinoma of skin, unspecified      Unspecified essential hypertension        Past Surgical History:   Procedure Laterality Date     HC REMOVAL OF TONSILS,<11 Y/O      Tonsils <10y.o.     SURGICAL HISTORY OF -       tubal ligation        Family History   Problem Relation Age of Onset     C.A.D. Mother 50        MI-58     Hypertension Mother      AGUSTÍN.A.NYDIA. Maternal Aunt      C.A.NYDIA. Maternal Uncle      C.A.NYDIA. Sister         angioplasty and stint-age 59     Hypertension Sister      Hypertension Brother      Diabetes No family hx of      Cerebrovascular Disease No family hx of      Breast Cancer No family hx of      Cancer - colorectal No family hx of        Social History     Socioeconomic History     Marital status:      Spouse name: Not on file     Number of children: Not on file     Years of education: Not on file     Highest education level: Not on file   Occupational History     Not on file   Tobacco Use     Smoking status: Never     Smokeless tobacco: Never   Vaping Use     Vaping Use: Never used   Substance and Sexual Activity     Alcohol use: No     Alcohol/week: 0.0 standard drinks of alcohol     Comment: One glass wine every  2-3 months     Drug use: No     Sexual activity: Not Currently     Partners: Male     Comment:    Other Topics Concern     Parent/sibling w/ CABG, MI or angioplasty before 65F 55M? Yes   Social History Narrative    Dairy/d 3 servings/d. plus a calcium supplement    Caffeine 2-3 servings/d    Exercise 6 x week    Sunscreen used - Not as often as she should    Seatbelts used - Yes    Working smoke/CO detectors in the home - Yes    Guns stored in the home - No    Self Breast Exams - Yes,occ    Self Testicular Exam - NA    Eye Exam up to date - Yes    Dental Exam up to date - Yes    Pap Smear up to date - No    Mammogram up to date - Had appt today    PSA up to date - NA    Dexa Scan up to date - Yes-Sept 2006    Flex Sig / Colonoscopy up to date -Yes-Nov 2007,repeat in 10yrs per pt    Immunizations up to date - yes-td 2007    Abuse: Current or Past(Physical, Sexual or Emotional)- No    Do you feel safe in your environment - Yes     Social Determinants of Health     Financial Resource Strain: Not on file   Food Insecurity: Not on file   Transportation Needs: Not on file   Physical Activity: Not on file   Stress: Not on file   Social Connections: Not on file   Intimate Partner Violence: Not on file   Housing Stability: Not on file       Outpatient Encounter Medications as of 7/26/2023   Medication Sig Dispense Refill     EPINEPHrine (ANY BX GENERIC EQUIV) 0.3 MG/0.3ML injection 2-pack Inject 0.3 mLs (0.3 mg) into the muscle as needed for anaphylaxis May repeat one time in 5-15 minutes if response to initial dose is inadequate. 2 each 1     metoprolol succinate ER (TOPROL XL) 25 MG 24 hr tablet Take 0.5 tablets (12.5 mg) by mouth daily 45 tablet 3     ondansetron (ZOFRAN ODT) 4 MG ODT tab Take 1 tablet (4 mg) by mouth every 8 hours as needed for nausea 9 tablet 0     simvastatin (ZOCOR) 20 MG tablet Take 1 tablet (20 mg) by mouth At Bedtime 90 tablet 3     No facility-administered encounter medications on file as  of 7/26/2023.             O:   NAD, WDWN, Alert & Oriented, Mood & Affect wnl, Vitals stable   Here today alone   /79   Pulse 71    General appearance normal   Vitals stable   Alert, oriented and in no acute distress   R POST EAR 1CM PINK PEARLY PAPULE       Eyes: Conjunctivae/lids:Normal     ENT: Lips, buccal mucosa, tongue: normal    MSK:Normal    Cardiovascular: peripheral edema none    Pulm: Breathing Normal    Lymph Nodes: No Head and Neck Lymphadenopathy     Neuro/Psych: Orientation:Alert and Orientedx3 ; Mood/Affect:normal       A/P:  R post ear basal cell carcinoma   MOHS:   Location    The rationale for Mohs surgery was discussed with the patient and consent was obtained.  The risks and benefits as well as alternatives to therapy were discussed, in detail.  Specifically, the risks of infection, scarring, bleeding, prolonged wound healing, incomplete removal, allergy to anesthesia, nerve injury and recurrence were addressed.  Indication for Mohs was Location. Prior to the procedure, the treatment site was clearly identified and, if available, confirmed with previous photos and confirmed by the patient   All components of the Universal Protocol/PAUSE rule were completed.  The Mohs surgeon operated in two distinct and integrated capacities as the surgeon and pathologist.      The area was prepped with Betasept.  A rim of normal appearing skin was marked circumferentially around the lesion.  The area was infiltrated with local anesthesia.  The tumor was first debulked to remove all clinically apparent tumor.  An incision following the standard Mohs approach was done and the specimen was oriented,mapped and placed in 1 block(s).  Each specimen was then chromacoded and processed in the Mohs laboratory using standard Mohs technique and submitted for frozen section histology.  Frozen section analysis showed no residual tumor but CLEAR MARGINS.      The tumor was excised using standard Mohs technique in 1  stages(s).  CLEAR MARGINS OBTAINED and Final defect size was 1.6 x 1.3 cm.     We discussed the options for wound management in full with the patient including risks/benefits/ possible outcomes.    .  REPAIR COMPLEX: Because of the tightness of the surrounding skin and Because of the size and full thickness nature of the defect, Because of the tightness of the surrounding skin, To maintain form and function, In order to avoid distortion, and Because of the proximity to the ear, a complex closure was planned. After LE anesthesia and prep, Burow's triangles were excised in the relaxed skin tension lines. The wound edges were widely undermined greater than width of the defect on both sides by dissection in the subcutaneous plane until adequate tissue mobility was obtained. Hemostasis was obtained. The wound edges were closed in a layered fashion using Vicryl and Fast Absorbing Plain Gut sutures. Postoperative length was 2.8 cm.   EBL minimal; complications none; wound care routine.  The patient was discharged in good condition and will return in one week for wound evaluation.  It was a pleasure speaking to Seema Barnes today.  Previous clinic notes and pertinent laboratory tests were reviewed prior to Seema Barnes's visit.  Signs and Symptoms of skin cancer discussed with patient.  Patient encouraged to perform monthly skin exams.  UV precautions reviewed with patient.  Risks of non-melanoma skin cancer discussed with patient   Return to clinic 6 months        Again, thank you for allowing me to participate in the care of your patient.        Sincerely,        Abdoulaye Ho MD

## 2023-07-31 ENCOUNTER — TELEPHONE (OUTPATIENT)
Dept: FAMILY MEDICINE | Facility: CLINIC | Age: 78
End: 2023-07-31
Payer: COMMERCIAL

## 2023-07-31 NOTE — TELEPHONE ENCOUNTER
Symptoms    Describe your symptoms:  Pt has had diarrhea X 6 weeks.  Pt was seen in ER 6/24 and tests were all normal/negative, but diarrhea continues.  No fever, no vomiting.  Please call patient and advise.      Any pain: Yes:     How long have you been having symptoms: 6  weeks    Have you been seen for this:  Yes: In ER 6/24    Appointment offered?: No    Triage offered?: Yes:     Home remedies tried: Imodium     Preferred Pharmacy:   [facility] [address]  [address] [address]  Phone: [phone] Fax: [phone] Alternate Fax: [phone], [phone]    Could we send this information to you in [employer] or would you prefer to receive a phone call?:   Patient would prefer a phone call   Okay to leave a detailed message?: Yes at Home number on file [phone] (home)

## 2023-07-31 NOTE — TELEPHONE ENCOUNTER
Patient reports having loose stools since 6/17/23.    She has loose stools most days that range from watery to loose stools with digested food. She almost had a formed stool one day but most times it has been loose. Patient reports they look normal colored. Patient denies blood or coffee colored stools. Patient reports some cramping at times prior to having a stool but then goes away. Patient reports some days she has no stools and other days she has a few loose stools. Patient has been on a bland diet and has tried other foods and she usually has stomach issues when she eats food that is not bland. Patient is pushing fluids.     Patient reviewed red flag symptoms and confirms she will call or go to the ER if any of those symptoms occur.     Patient scheduled to see Dr. Houston 8/9/23.    Martha Ibarra RN on 7/31/2023 at 12:35 PM

## 2023-08-01 ENCOUNTER — TELEPHONE (OUTPATIENT)
Dept: DERMATOLOGY | Facility: CLINIC | Age: 78
End: 2023-08-01

## 2023-08-01 NOTE — TELEPHONE ENCOUNTER
[facility] Call Center    Phone Message    May a detailed message be left on voicemail: yes     Reason for Call: Other: wound care - pt would a call from a nurse about her wound from mohs. Pt noticed a string in the wound and is not sure if it is part of the gauze or what it is. Due to location of the wound pt can not see it well. Please call or have pt come in to check. Thanks       Action Taken: Message routed to:  Other: [place] derm    Travel Screening: Not Applicable

## 2023-08-01 NOTE — TELEPHONE ENCOUNTER
Called patient- she has a small string in the wound- not sure if its part of the gauze or a suture- advised to not pull on the string- if its a part of the suture it will dissolve- if it is gauze- it will come of with the vaseline/aquaphor ointment and cleaning.  Patient will call if she has any other concerns.    Thank you,    Carin WATERS RN BSN  [facility]- [phone]

## 2023-08-08 ENCOUNTER — HOSPITAL ENCOUNTER (EMERGENCY)
Facility: CLINIC | Age: 78
Discharge: HOME | End: 2023-08-08
Attending: NURSE PRACTITIONER | Admitting: NURSE PRACTITIONER
Payer: COMMERCIAL

## 2023-08-08 VITALS
SYSTOLIC BLOOD PRESSURE: 113 MMHG | TEMPERATURE: 98.4 F | HEART RATE: 90 BPM | DIASTOLIC BLOOD PRESSURE: 64 MMHG | OXYGEN SATURATION: 99 % | RESPIRATION RATE: 18 BRPM

## 2023-08-08 DIAGNOSIS — R19.7 DIARRHEA: ICD-10-CM

## 2023-08-08 DIAGNOSIS — R11.0 NAUSEA: ICD-10-CM

## 2023-08-08 PROCEDURE — 99213 OFFICE O/P EST LOW 20 MIN: CPT | Performed by: NURSE PRACTITIONER

## 2023-08-08 PROCEDURE — G0463 HOSPITAL OUTPT CLINIC VISIT: HCPCS | Performed by: NURSE PRACTITIONER

## 2023-08-08 RX ORDER — ONDANSETRON 4 MG/1
4 TABLET, ORALLY DISINTEGRATING ORAL EVERY 8 HOURS PRN
Qty: 10 TABLET | Refills: 0 | Status: SHIPPED | OUTPATIENT
Start: 2023-08-08 | End: 2023-08-11

## 2023-08-08 NOTE — ED TRIAGE NOTES
Patient started having diarrhea about a week ago. She states the diarrhea has stopped but she is very nauseous. She is drinking well but unable to eat due to nausea. Pt has pain in upper abdomen.

## 2023-08-08 NOTE — ED PROVIDER NOTES
History     Chief Complaint   Patient presents with    Abdominal Pain     HPI  Seema Barnes is a 78 year old female who presents to the urgent care for evaluation of nausea. Patient reports she has been struggling with diarrhea/loose stools since 6/17/23. Symptoms has brief improvement then return again about ten days ago. She significantly decreased her food intake yesterday to see if this would help; she's been adhering to a bland diet. She has since developed nausea and is requesting zofran. She has PCP appointment set up for tomorrow. Denies fever, headache, dizziness, congestion, sore throat, cough, shortness of breath, chest pain, abdominal pain, vomiting, urinary symptoms, and rash.    Allergies:  Allergies   Allergen Reactions    Wasps [Hornets] Anaphylaxis    No Known Drug Allergy        Problem List:    Patient Active Problem List    Diagnosis Date Noted    Family history of ischemic heart disease 11/16/2017     Priority: Medium    Hyperlipidemia LDL goal <130 12/19/2012     Priority: Medium    Hypertension goal BP (blood pressure) < 130/80 12/05/2011     Priority: Medium    Stage 3 chronic kidney disease, unspecified whether stage 3a or 3b CKD (H) 10/04/2011     Priority: Medium     Per provider            Past Medical History:    Past Medical History:   Diagnosis Date    MEDICAL HISTORY OF -     Pure hypercholesterolemia     Squamous cell carcinoma of skin, unspecified     Unspecified essential hypertension        Past Surgical History:    Past Surgical History:   Procedure Laterality Date    HC REMOVAL OF TONSILS,<11 Y/O      Tonsils <10y.o.    SURGICAL HISTORY OF -       tubal ligation       Family History:    Family History   Problem Relation Age of Onset    C.A.D. Mother 50        MI-58    Hypertension Mother     C.A.D. Maternal Aunt     C.A.D. Maternal Uncle     C.A.D. Sister         angioplasty and stint-age 59    Hypertension Sister     Hypertension Brother     Diabetes No family hx of      Cerebrovascular Disease No family hx of     Breast Cancer No family hx of     Cancer - colorectal No family hx of        Social History:  Marital Status:   [2]  Social History     Tobacco Use    Smoking status: Never    Smokeless tobacco: Never   Vaping Use    Vaping Use: Never used   Substance Use Topics    Alcohol use: No     Alcohol/week: 0.0 standard drinks of alcohol     Comment: One glass wine every 2-3 months    Drug use: No        Medications:    ondansetron (ZOFRAN ODT) 4 MG ODT tab  EPINEPHrine (ANY BX GENERIC EQUIV) 0.3 MG/0.3ML injection 2-pack  metoprolol succinate ER (TOPROL XL) 25 MG 24 hr tablet  simvastatin (ZOCOR) 20 MG tablet          Review of Systems   All other systems reviewed and are negative.      Physical Exam   BP: 113/64  Pulse: 90  Temp: 98.4  F (36.9  C)  Resp: 18  SpO2: 99 %      Physical Exam  Constitutional:       General: She is not in acute distress.     Appearance: Normal appearance.   Eyes:      Conjunctiva/sclera: Conjunctivae normal.      Pupils: Pupils are equal, round, and reactive to light.   Cardiovascular:      Rate and Rhythm: Normal rate.   Pulmonary:      Effort: Pulmonary effort is normal.   Abdominal:      General: There is no distension.      Palpations: Abdomen is soft.      Tenderness: There is no abdominal tenderness. There is no right CVA tenderness, left CVA tenderness, guarding or rebound.   Musculoskeletal:         General: Normal range of motion.      Cervical back: Normal range of motion.   Skin:     General: Skin is warm.      Capillary Refill: Capillary refill takes less than 2 seconds.   Neurological:      General: No focal deficit present.      Mental Status: She is alert.         ED Course                 Procedures       No results found for this or any previous visit (from the past 24 hour(s)).    Medications - No data to display    Assessments & Plan (with Medical Decision Making)     Seema Barnes is a 78 year old female who presents to  the urgent care for evaluation of nausea. Patient reports she has been struggling with diarrhea/loose stools since 6/17/23. Symptoms has brief improvement then return again about ten days ago. She significantly decreased her food intake yesterday to see if this would help; she's been adhering to a bland diet. She has since developed nausea and is requesting zofran. She has PCP appointment set up for tomorrow. Vital signs normal, physical exam as above. Discussed option of ED transfer for further workup to potentially include labs and IV fluids however patient declines as she would only like zofran today and follow up tomorrow. Encouraged her to return to the ED tonight with any new or worsening symptoms which she agrees. Discharged in good condition.   I have reviewed the nursing notes.    I have reviewed the findings, diagnosis, plan and need for follow up with the patient.      Discharge Medication List as of 8/8/2023  4:15 PM          Final diagnoses:   Diarrhea   Nausea       8/8/2023   [facility] EMERGENCY DEPT       Imelda Alvarado, APRN CNP  08/08/23 [id number]

## 2023-08-09 ENCOUNTER — OFFICE VISIT (OUTPATIENT)
Dept: FAMILY MEDICINE | Facility: CLINIC | Age: 78
End: 2023-08-09
Payer: COMMERCIAL

## 2023-08-09 VITALS
WEIGHT: 161.38 LBS | TEMPERATURE: 97.7 F | HEART RATE: 74 BPM | RESPIRATION RATE: 22 BRPM | HEIGHT: 66 IN | DIASTOLIC BLOOD PRESSURE: 72 MMHG | SYSTOLIC BLOOD PRESSURE: 112 MMHG | OXYGEN SATURATION: 97 % | BODY MASS INDEX: 25.94 KG/M2

## 2023-08-09 DIAGNOSIS — R19.7 DIARRHEA, UNSPECIFIED TYPE: Primary | ICD-10-CM

## 2023-08-09 PROCEDURE — 99213 OFFICE O/P EST LOW 20 MIN: CPT | Performed by: FAMILY MEDICINE

## 2023-08-09 ASSESSMENT — PAIN SCALES - GENERAL: PAINLEVEL: MODERATE PAIN (5)

## 2023-08-09 ASSESSMENT — ENCOUNTER SYMPTOMS: DIARRHEA: 1

## 2023-08-09 NOTE — PROGRESS NOTES
"  Assessment & Plan     ASSESSMENT/PLAN:      ICD-10-CM    1. Diarrhea, unspecified type  R19.7         Sounds like started suddenly and has been waxing/waning.  Abdominal symptoms (nausea) made worse a bit with the zofran which has caused some constipation (no stool x 3 days), not eating much.  Will try to resume a more normal diet.    As the simvastatin and nsaids could contribute to a microscopic colitis picture, will hold those for now.   Consider colonoscopy if needed for biopsies.    Patient Instructions   Metamucil/fibercon to add bulk and form to the stools    Stop the Simvastatin for now - could be contributing to diarrhea.  Ibuprofen/aleve can also contribute to colitis - avoid these.                         BMI:   Estimated body mass index is 25.85 kg/m  as calculated from the following:    Height as of this encounter: 1.683 m (5' 6.25\").    Weight as of this encounter: 73.2 kg (161 lb 6 oz).           Nat Houston MD  [facility]    Marcelino Stephenson is a 78 year old, presenting for the following health issues:  Diarrhea        8/9/2023    10:05 AM   Additional Questions   Roomed by Lien EDWARDS CMA   Accompanied by Self       Diarrhea         ED/UC Followup:    Facility:  [facility]  Date of visit: 8/8/2023  Reason for visit: Diarrhea  Current Status: Today she notes that she is very nauseated. She has taken Zofran without improvement. She notes that she has diarrhea through Sunday and so then was eating very little and is now having constipation and abdominal cramping. She is eating apples sauce, rice cakes, bananna, cream of rice cereal. No emesis or fever. She notes that this did begin after to traveling to [place] with friends in June    Symptoms have been up and down for 6 weeks.    Now last bowel movement was 3 days ago - has been taking the zofran which has caused some constipation now.    Looking up medications - simvastatin and NSAIDS could cause micro " "colitis. We discussed that the true diagnosis of this is made with biopsy at colonoscopy.    Review of Systems   Gastrointestinal:  Positive for diarrhea.            Objective    /72   Pulse 74   Temp 97.7  F (36.5  C) (Tympanic)   Resp 22   Ht 1.683 m (5' 6.25\")   Wt 73.2 kg (161 lb 6 oz)   SpO2 97%   BMI 25.85 kg/m    Body mass index is 25.85 kg/m .  Physical Exam   GENERAL: healthy, alert and no distress  NECK: no adenopathy, no asymmetry, masses, or scars and thyroid normal to palpation  RESP: lungs clear to auscultation - no rales, rhonchi or wheezes  CV: regular rate and rhythm, normal S1 S2, no S3 or S4, no murmur, click or rub, no peripheral edema and peripheral pulses strong  ABDOMEN: soft, nontender, no hepatosplenomegaly, no masses and bowel sounds normal  MS: no gross musculoskeletal defects noted, no edema                      "

## 2023-08-09 NOTE — PATIENT INSTRUCTIONS
"Metamucil/fibercon to add bulk and form to the stools    Stop the Simvastatin for now - could be contributing to diarrhea.  Ibuprofen/aleve can also contribute to colitis - avoid these.            Thank you for choosing [facility].      When you are out of refills or the refills say \"zero\", it is time to schedule your next appointment in clinic!    Labs are released to you almost immediately and sometimes before I have had a chance to review them.  I review labs regularly and once they are all in, you will be sent a letter with your results and/or if you are signed up for on-line services, you will be e-mailed the results with my interpretation. If there are serious findings, you typically will be called.    If you have any questions about your visit, your symptoms, your medication, your test results or it is not clear what your diagnosis or treatment plan is please contact me (via new test company) or call the care team at [phone] option #2      Our Clinic hours are:  Mondays    7:20 am - 7 pm  Tues -  Fri  7:20 am - 5 pm      The clinic lab opens at 7:30 am Mon - Fri and appointments are required.    [facility] [place]  Ph. [phone]  Monday-Thursday 8 am - 7pm  Tues/Wed/Fri 8 am - 5:30 pm       "

## 2023-08-23 ENCOUNTER — OFFICE VISIT (OUTPATIENT)
Dept: DERMATOLOGY | Facility: CLINIC | Age: 78
End: 2023-08-23
Payer: COMMERCIAL

## 2023-08-23 DIAGNOSIS — Z85.828 HISTORY OF SKIN CANCER: Primary | ICD-10-CM

## 2023-08-23 PROCEDURE — 99212 OFFICE O/P EST SF 10 MIN: CPT | Performed by: DERMATOLOGY

## 2023-08-23 NOTE — PROGRESS NOTES
Seema Barnes is an extremely pleasant 78 year old year old female patient here today for recheck ear, well healed.  Patient has no other skin complaints today.  Remainder of the HPI, Meds, PMH, Allergies, FH, and SH was reviewed in chart.      Past Medical History:   Diagnosis Date    MEDICAL HISTORY OF -     migraines - none since 1995    Pure hypercholesterolemia     Squamous cell carcinoma of skin, unspecified     Unspecified essential hypertension        Past Surgical History:   Procedure Laterality Date    HC REMOVAL OF TONSILS,<9 Y/O      Tonsils <10y.o.    SURGICAL HISTORY OF -       tubal ligation        Family History   Problem Relation Age of Onset    C.A.D. Mother 50        MI-58    Hypertension Mother     C.A.D. Maternal Aunt     C.A.D. Maternal Uncle     C.A.D. Sister         angioplasty and stint-age 59    Hypertension Sister     Hypertension Brother     Diabetes No family hx of     Cerebrovascular Disease No family hx of     Breast Cancer No family hx of     Cancer - colorectal No family hx of        Social History     Socioeconomic History    Marital status:      Spouse name: Not on file    Number of children: Not on file    Years of education: Not on file    Highest education level: Not on file   Occupational History    Not on file   Tobacco Use    Smoking status: Never    Smokeless tobacco: Never   Vaping Use    Vaping Use: Never used   Substance and Sexual Activity    Alcohol use: No     Alcohol/week: 0.0 standard drinks of alcohol     Comment: One glass wine every 2-3 months    Drug use: No    Sexual activity: Not Currently     Partners: Male     Comment:    Other Topics Concern    Parent/sibling w/ CABG, MI or angioplasty before 65F 55M? Yes   Social History Narrative    Dairy/d 3 servings/d. plus a calcium supplement    Caffeine 2-3 servings/d    Exercise 6 x week    Sunscreen used - Not as often as she should    Seatbelts used - Yes    Working smoke/CO detectors in the  home - Yes    Guns stored in the home - No    Self Breast Exams - Yes,occ    Self Testicular Exam - NA    Eye Exam up to date - Yes    Dental Exam up to date - Yes    Pap Smear up to date - No    Mammogram up to date - Had appt today    PSA up to date - NA    Dexa Scan up to date - Yes-Sept 2006    Flex Sig / Colonoscopy up to date -Yes-Nov 2007,repeat in 10yrs per pt    Immunizations up to date - yes-td 2007    Abuse: Current or Past(Physical, Sexual or Emotional)- No    Do you feel safe in your environment - Yes     Social Determinants of Health     Financial Resource Strain: Not on file   Food Insecurity: Not on file   Transportation Needs: Not on file   Physical Activity: Not on file   Stress: Not on file   Social Connections: Not on file   Intimate Partner Violence: Not on file   Housing Stability: Not on file       Outpatient Encounter Medications as of 8/23/2023   Medication Sig Dispense Refill    EPINEPHrine (ANY BX GENERIC EQUIV) 0.3 MG/0.3ML injection 2-pack Inject 0.3 mLs (0.3 mg) into the muscle as needed for anaphylaxis May repeat one time in 5-15 minutes if response to initial dose is inadequate. 2 each 1    metoprolol succinate ER (TOPROL XL) 25 MG 24 hr tablet Take 0.5 tablets (12.5 mg) by mouth daily 45 tablet 3    simvastatin (ZOCOR) 20 MG tablet Take 1 tablet (20 mg) by mouth At Bedtime 90 tablet 3     No facility-administered encounter medications on file as of 8/23/2023.             O:   NAD, WDWN, Alert & Oriented, Mood & Affect wnl, Vitals stable   General appearance normal   Vitals stable   Alert, oriented and in no acute distress   R post ear spitting suture      Eyes: Conjunctivae/lids:Normal     ENT: Lips, buccal mucosa, tongue: normal    MSK:Normal    Cardiovascular: peripheral edema none    Pulm: Breathing Normal    Neuro/Psych: Orientation:Alert and Orientedx3 ; Mood/Affect:normal       A/P:  Hx of non-melanoma skin cancer   Well healed  Spitting suture removed   It was a pleasure  speaking to Seema Barnes today.  Previous clinic notes and pertinent laboratory tests were reviewed prior to Seema Barnes's visit.return to clinic 6 months

## 2023-08-23 NOTE — LETTER
8/23/2023         RE: Seema Barnes  [address] [facility] [address]        Dear Colleague,    Thank you for referring your patient, Seema Barnes, to the [facility]. Please see a copy of my visit note below.    Seema Barnes is an extremely pleasant 78 year old year old female patient here today for recheck ear, well healed.  Patient has no other skin complaints today.  Remainder of the HPI, Meds, PMH, Allergies, FH, and SH was reviewed in chart.      Past Medical History:   Diagnosis Date     MEDICAL HISTORY OF -     migraines - none since 1995     Pure hypercholesterolemia      Squamous cell carcinoma of skin, unspecified      Unspecified essential hypertension        Past Surgical History:   Procedure Laterality Date     HC REMOVAL OF TONSILS,<9 Y/O      Tonsils <10y.o.     SURGICAL HISTORY OF -       tubal ligation        Family History   Problem Relation Age of Onset     C.A.D. Mother 50        MI-58     Hypertension Mother      C.A.D. Maternal Aunt      C.A.D. Maternal Uncle      C.A.D. Sister         angioplasty and stint-age 59     Hypertension Sister      Hypertension Brother      Diabetes No family hx of      Cerebrovascular Disease No family hx of      Breast Cancer No family hx of      Cancer - colorectal No family hx of        Social History     Socioeconomic History     Marital status:      Spouse name: Not on file     Number of children: Not on file     Years of education: Not on file     Highest education level: Not on file   Occupational History     Not on file   Tobacco Use     Smoking status: Never     Smokeless tobacco: Never   Vaping Use     Vaping Use: Never used   Substance and Sexual Activity     Alcohol use: No     Alcohol/week: 0.0 standard drinks of alcohol     Comment: One glass wine every 2-3 months     Drug use: No     Sexual activity: Not Currently     Partners: Male     Comment:    Other Topics Concern      Parent/sibling w/ CABG, MI or angioplasty before 65F 55M? Yes   Social History Narrative    Dairy/d 3 servings/d. plus a calcium supplement    Caffeine 2-3 servings/d    Exercise 6 x week    Sunscreen used - Not as often as she should    Seatbelts used - Yes    Working smoke/CO detectors in the home - Yes    Guns stored in the home - No    Self Breast Exams - Yes,occ    Self Testicular Exam - NA    Eye Exam up to date - Yes    Dental Exam up to date - Yes    Pap Smear up to date - No    Mammogram up to date - Had appt today    PSA up to date - NA    Dexa Scan up to date - Yes-Sept 2006    Flex Sig / Colonoscopy up to date -Yes-Nov 2007,repeat in 10yrs per pt    Immunizations up to date - yes-td 2007    Abuse: Current or Past(Physical, Sexual or Emotional)- No    Do you feel safe in your environment - Yes     Social Determinants of Health     Financial Resource Strain: Not on file   Food Insecurity: Not on file   Transportation Needs: Not on file   Physical Activity: Not on file   Stress: Not on file   Social Connections: Not on file   Intimate Partner Violence: Not on file   Housing Stability: Not on file       Outpatient Encounter Medications as of 8/23/2023   Medication Sig Dispense Refill     EPINEPHrine (ANY BX GENERIC EQUIV) 0.3 MG/0.3ML injection 2-pack Inject 0.3 mLs (0.3 mg) into the muscle as needed for anaphylaxis May repeat one time in 5-15 minutes if response to initial dose is inadequate. 2 each 1     metoprolol succinate ER (TOPROL XL) 25 MG 24 hr tablet Take 0.5 tablets (12.5 mg) by mouth daily 45 tablet 3     simvastatin (ZOCOR) 20 MG tablet Take 1 tablet (20 mg) by mouth At Bedtime 90 tablet 3     No facility-administered encounter medications on file as of 8/23/2023.             O:   NAD, WDWN, Alert & Oriented, Mood & Affect wnl, Vitals stable   General appearance normal   Vitals stable   Alert, oriented and in no acute distress   R post ear spitting suture      Eyes: Conjunctivae/lids:Normal      ENT: Lips, buccal mucosa, tongue: normal    MSK:Normal    Cardiovascular: peripheral edema none    Pulm: Breathing Normal    Neuro/Psych: Orientation:Alert and Orientedx3 ; Mood/Affect:normal       A/P:  Hx of non-melanoma skin cancer   Well healed  Spitting suture removed   It was a pleasure speaking to Seema Barnes today.  Previous clinic notes and pertinent laboratory tests were reviewed prior to Seema Barnes's visit.return to clinic 6 months      Again, thank you for allowing me to participate in the care of your patient.        Sincerely,        Abdoulaye Ho MD

## 2023-10-05 ENCOUNTER — PATIENT OUTREACH (OUTPATIENT)
Dept: CARE COORDINATION | Facility: CLINIC | Age: 78
End: 2023-10-05
Payer: COMMERCIAL

## 2023-11-12 ASSESSMENT — ENCOUNTER SYMPTOMS
CONSTIPATION: 0
NERVOUS/ANXIOUS: 0
COUGH: 0
HEMATURIA: 0
DYSURIA: 0
JOINT SWELLING: 0
HEMATOCHEZIA: 0
CHILLS: 0
BREAST MASS: 0
PALPITATIONS: 0
DIARRHEA: 0
HEARTBURN: 0
ABDOMINAL PAIN: 0
HEADACHES: 0
SORE THROAT: 0
DIZZINESS: 0
FREQUENCY: 0
PARESTHESIAS: 0
WEAKNESS: 0
EYE PAIN: 0
ARTHRALGIAS: 0
SHORTNESS OF BREATH: 0
NAUSEA: 0
MYALGIAS: 0
FEVER: 0

## 2023-11-12 ASSESSMENT — ACTIVITIES OF DAILY LIVING (ADL): CURRENT_FUNCTION: NO ASSISTANCE NEEDED

## 2023-11-15 ENCOUNTER — OFFICE VISIT (OUTPATIENT)
Dept: FAMILY MEDICINE | Facility: CLINIC | Age: 78
End: 2023-11-15
Payer: COMMERCIAL

## 2023-11-15 ENCOUNTER — LAB (OUTPATIENT)
Dept: LAB | Facility: CLINIC | Age: 78
End: 2023-11-15
Payer: COMMERCIAL

## 2023-11-15 VITALS
SYSTOLIC BLOOD PRESSURE: 128 MMHG | HEART RATE: 66 BPM | BODY MASS INDEX: 26.24 KG/M2 | OXYGEN SATURATION: 98 % | TEMPERATURE: 97.7 F | RESPIRATION RATE: 22 BRPM | WEIGHT: 163.25 LBS | HEIGHT: 66 IN | DIASTOLIC BLOOD PRESSURE: 70 MMHG

## 2023-11-15 DIAGNOSIS — N18.30 STAGE 3 CHRONIC KIDNEY DISEASE, UNSPECIFIED WHETHER STAGE 3A OR 3B CKD (H): ICD-10-CM

## 2023-11-15 DIAGNOSIS — E78.5 HYPERLIPIDEMIA LDL GOAL <130: ICD-10-CM

## 2023-11-15 DIAGNOSIS — Z91.030 BEE ALLERGY STATUS: ICD-10-CM

## 2023-11-15 DIAGNOSIS — I10 HYPERTENSION GOAL BP (BLOOD PRESSURE) < 130/80: ICD-10-CM

## 2023-11-15 DIAGNOSIS — E78.5 HYPERLIPIDEMIA LDL GOAL <100: ICD-10-CM

## 2023-11-15 DIAGNOSIS — Z00.00 ENCOUNTER FOR MEDICARE ANNUAL WELLNESS EXAM: Primary | ICD-10-CM

## 2023-11-15 LAB
ANION GAP SERPL CALCULATED.3IONS-SCNC: 11 MMOL/L (ref 7–15)
BUN SERPL-MCNC: 15.8 MG/DL (ref 8–23)
CALCIUM SERPL-MCNC: 10 MG/DL (ref 8.8–10.2)
CHLORIDE SERPL-SCNC: 104 MMOL/L (ref 98–107)
CHOLEST SERPL-MCNC: 185 MG/DL
CREAT SERPL-MCNC: 0.97 MG/DL (ref 0.51–0.95)
CREAT UR-MCNC: 33 MG/DL
DEPRECATED HCO3 PLAS-SCNC: 23 MMOL/L (ref 22–29)
EGFRCR SERPLBLD CKD-EPI 2021: 59 ML/MIN/1.73M2
GLUCOSE SERPL-MCNC: 96 MG/DL (ref 70–99)
HDLC SERPL-MCNC: 48 MG/DL
HGB BLD-MCNC: 14.6 G/DL (ref 11.7–15.7)
LDLC SERPL CALC-MCNC: 112 MG/DL
MICROALBUMIN UR-MCNC: <12 MG/L
MICROALBUMIN/CREAT UR: NORMAL MG/G{CREAT}
NONHDLC SERPL-MCNC: 137 MG/DL
POTASSIUM SERPL-SCNC: 4.7 MMOL/L (ref 3.4–5.3)
SODIUM SERPL-SCNC: 138 MMOL/L (ref 135–145)
TRIGL SERPL-MCNC: 123 MG/DL

## 2023-11-15 PROCEDURE — 99214 OFFICE O/P EST MOD 30 MIN: CPT | Mod: 25 | Performed by: FAMILY MEDICINE

## 2023-11-15 PROCEDURE — 82570 ASSAY OF URINE CREATININE: CPT

## 2023-11-15 PROCEDURE — 82043 UR ALBUMIN QUANTITATIVE: CPT

## 2023-11-15 PROCEDURE — G0439 PPPS, SUBSEQ VISIT: HCPCS | Performed by: FAMILY MEDICINE

## 2023-11-15 PROCEDURE — 36415 COLL VENOUS BLD VENIPUNCTURE: CPT

## 2023-11-15 PROCEDURE — 85018 HEMOGLOBIN: CPT

## 2023-11-15 PROCEDURE — 80061 LIPID PANEL: CPT

## 2023-11-15 PROCEDURE — 80048 BASIC METABOLIC PNL TOTAL CA: CPT

## 2023-11-15 RX ORDER — METOPROLOL SUCCINATE 25 MG/1
12.5 TABLET, EXTENDED RELEASE ORAL DAILY
Qty: 45 TABLET | Refills: 3 | Status: SHIPPED | OUTPATIENT
Start: 2023-11-15

## 2023-11-15 RX ORDER — EPINEPHRINE 0.3 MG/.3ML
0.3 INJECTION SUBCUTANEOUS PRN
Qty: 2 EACH | Refills: 1 | Status: SHIPPED | OUTPATIENT
Start: 2023-11-15

## 2023-11-15 RX ORDER — SIMVASTATIN 20 MG
20 TABLET ORAL AT BEDTIME
Qty: 90 TABLET | Refills: 3 | Status: SHIPPED | OUTPATIENT
Start: 2023-11-15

## 2023-11-15 RX ORDER — RESPIRATORY SYNCYTIAL VIRUS VACCINE 120MCG/0.5
0.5 KIT INTRAMUSCULAR ONCE
Qty: 1 EACH | Refills: 0 | Status: CANCELLED | OUTPATIENT
Start: 2023-11-15 | End: 2023-11-15

## 2023-11-15 ASSESSMENT — ENCOUNTER SYMPTOMS
PARESTHESIAS: 0
HEMATOCHEZIA: 0
DIZZINESS: 0
COUGH: 0
BREAST MASS: 0
ARTHRALGIAS: 0
ABDOMINAL PAIN: 0
CONSTIPATION: 0
CHILLS: 0
NAUSEA: 0
DIARRHEA: 0
WEAKNESS: 0
HEMATURIA: 0
SORE THROAT: 0
HEADACHES: 0
PALPITATIONS: 0
FREQUENCY: 0
DYSURIA: 0
FEVER: 0
MYALGIAS: 0
EYE PAIN: 0
NERVOUS/ANXIOUS: 0
JOINT SWELLING: 0
SHORTNESS OF BREATH: 0
HEARTBURN: 0

## 2023-11-15 ASSESSMENT — ACTIVITIES OF DAILY LIVING (ADL): CURRENT_FUNCTION: NO ASSISTANCE NEEDED

## 2023-11-15 ASSESSMENT — PAIN SCALES - GENERAL: PAINLEVEL: NO PAIN (0)

## 2023-11-15 NOTE — PROGRESS NOTES
"SUBJECTIVE:   Seema is a 78 year old, presenting for the following:  Medicare Visit        11/15/2023    12:51 PM   Additional Questions   Roomed by Lien doran CMA   Accompanied by Self       Are you in the first 12 months of your Medicare coverage?  No    Healthy Habits:     In general, how would you rate your overall health?  Excellent    Frequency of exercise:  4-5 days/week    Duration of exercise:  30-45 minutes    Do you usually eat at least 4 servings of fruit and vegetables a day, include whole grains    & fiber and avoid regularly eating high fat or \"junk\" foods?  Yes    Taking medications regularly:  Yes    Medication side effects:  None    Ability to successfully perform activities of daily living:  No assistance needed    Home Safety:  No safety concerns identified    Hearing Impairment:  No hearing concerns    In the past 6 months, have you been bothered by leaking of urine? Yes    In general, how would you rate your overall mental or emotional health?  Excellent    Additional concerns today:  No          Have you ever done Advance Care Planning? (For example, a Health Directive, POLST, or a discussion with a medical provider or your loved ones about your wishes): Yes, patient states has an Advance Care Planning document and will bring a copy to the clinic.       Fall risk  Fallen 2 or more times in the past year?: No  Any fall with injury in the past year?: No    Cognitive Screening   1) Repeat 3 items (Leader, Season, Table)    2) Clock draw: NORMAL  3) 3 item recall: Recalls 3 objects  Results: 3 items recalled: COGNITIVE IMPAIRMENT LESS LIKELY    Mini-CogTM Copyright LIVIA Chapin. Licensed by the author for use in [facility]; reprinted with permission (demetria@.[facility]). All rights reserved.      Do you have sleep apnea, excessive snoring or daytime drowsiness? : no    Reviewed and updated as needed this visit by clinical staff   Tobacco  Allergies  Meds  Problems  Med Hx  Surg Hx  Fam Hx  "         Reviewed and updated as needed this visit by Provider   Tobacco  Allergies  Meds  Problems  Med Hx  Surg Hx  Fam Hx         Social History     Tobacco Use     Smoking status: Never     Smokeless tobacco: Never   Substance Use Topics     Alcohol use: No     Alcohol/week: 0.0 standard drinks of alcohol     Comment: One glass wine every 2-3 months             11/12/2023     5:00 PM   Alcohol Use   Prescreen: >3 drinks/day or >7 drinks/week? No     Do you have a current opioid prescription? No  Do you use any other controlled substances or medications that are not prescribed by a provider? None        Hyperlipidemia Follow-Up  Simvastatin 20mg qhs  Are you regularly taking any medication or supplement to lower your cholesterol?   Yes- statin  Are you having muscle aches or other side effects that you think could be caused by your cholesterol lowering medication?  No    Hypertension Follow-up  Metoprolol 12.5mg qd  Do you check your blood pressure regularly outside of the clinic? No   Are you following a low salt diet? No  Are your blood pressures ever more than 140 on the top number (systolic) OR more   than 90 on the bottom number (diastolic), for example 140/90? N/A    BP Readings from Last 6 Encounters:   11/15/23 128/70   08/09/23 112/72   08/08/23 113/64   07/26/23 121/79   07/12/23 138/82   06/24/23 139/84       Current providers sharing in care for this patient include:   Patient Care Team:  Nat Houston MD as PCP - General (Family Practice)  Nat Houston MD as Assigned PCP  Kylie Pemberton PA-C as Physician Assistant (Dermatology)  Abdoulaye Ho MD as Assigned Surgical Provider    The following health maintenance items are reviewed in Epic and correct as of today:  Health Maintenance   Topic Date Due     URINALYSIS  Never done     RSV VACCINE (Pregnancy & 60+) (1 - 1-dose 60+ series) Never done     MICROALBUMIN  07/19/2020     DEXA  09/05/2021     BMP  11/04/2023      "LIPID  11/04/2023     ANNUAL REVIEW OF HM ORDERS  11/04/2023     HEMOGLOBIN  11/04/2023     MEDICARE ANNUAL WELLNESS VISIT  11/15/2024     FALL RISK ASSESSMENT  11/15/2024     ADVANCE CARE PLANNING  11/04/2027     DTAP/TDAP/TD IMMUNIZATION (3 - Td or Tdap) 07/19/2029     PHQ-2 (once per calendar year)  Completed     INFLUENZA VACCINE  Completed     Pneumococcal Vaccine: 65+ Years  Completed     ZOSTER IMMUNIZATION  Completed     COVID-19 Vaccine  Completed     IPV IMMUNIZATION  Aged Out     HPV IMMUNIZATION  Aged Out     MENINGITIS IMMUNIZATION  Aged Out     RSV MONOCLONAL ANTIBODY  Aged Out     COLONOSCOPY  Discontinued     Lab work is in process      Mammogram Screening - Patient over age 75, has elected to discontinue screenings.  Pertinent mammograms are reviewed under the imaging tab.    Review of Systems   Constitutional:  Negative for chills and fever.   HENT:  Negative for congestion, ear pain, hearing loss and sore throat.    Eyes:  Negative for pain and visual disturbance.   Respiratory:  Negative for cough and shortness of breath.    Cardiovascular:  Negative for chest pain, palpitations and peripheral edema.   Gastrointestinal:  Negative for abdominal pain, constipation, diarrhea, heartburn, hematochezia and nausea.   Breasts:  Negative for tenderness, breast mass and discharge.   Genitourinary:  Negative for dysuria, frequency, genital sores, hematuria, pelvic pain, urgency, vaginal bleeding and vaginal discharge.   Musculoskeletal:  Negative for arthralgias, joint swelling and myalgias.   Skin:  Negative for rash.   Neurological:  Negative for dizziness, weakness, headaches and paresthesias.   Psychiatric/Behavioral:  Negative for mood changes. The patient is not nervous/anxious.          OBJECTIVE:   /70   Pulse 66   Temp 97.7  F (36.5  C) (Tympanic)   Resp 22   Ht 1.683 m (5' 6.25\")   Wt 74 kg (163 lb 4 oz)   SpO2 98%   BMI 26.15 kg/m   Estimated body mass index is 26.15 kg/m  as " "calculated from the following:    Height as of this encounter: 1.683 m (5' 6.25\").    Weight as of this encounter: 74 kg (163 lb 4 oz).  Physical Exam  GENERAL: healthy, alert and no distress  NECK: no adenopathy, no asymmetry, masses, or scars and thyroid normal to palpation  RESP: lungs clear to auscultation - no rales, rhonchi or wheezes  CV: regular rates and rhythm, normal S1 S2, no S3 or S4, grade 2/6 systolic murmur heard best over the rusb, peripheral pulses strong, and no peripheral edema  ABDOMEN: soft, nontender, no hepatosplenomegaly, no masses and bowel sounds normal  MS: no gross musculoskeletal defects noted, no edema        ASSESSMENT / PLAN:   (Z00.00) Encounter for Medicare annual wellness exam  (primary encounter diagnosis)  Comment:    Plan:      (N18.30) Stage 3 chronic kidney disease, unspecified whether stage 3a or 3b CKD (H)  Comment: discussed and information given  Plan: Albumin Random Urine Quantitative with Creat         Ratio, BASIC METABOLIC PANEL, Hemoglobin             (I10) Hypertension goal BP (blood pressure) < 130/80  Comment:  well controlled  Plan: metoprolol succinate ER (TOPROL XL) 25 MG 24 hr        tablet             (Z91.030) Bee allergy status  Comment:    Plan: EPINEPHrine (ANY BX GENERIC EQUIV) 0.3 MG/0.3ML        injection 2-pack             (E78.5) Hyperlipidemia LDL goal <100  Comment:    Plan: simvastatin (ZOCOR) 20 MG tablet         : Lipid panel reflex to direct LDL Non-fasting         Continue statin    Patient has been advised of split billing requirements and indicates understanding: Yes      COUNSELING:  Reviewed preventive health counseling, as reflected in patient instructions       Regular exercise       Healthy diet/nutrition      BMI:   Estimated body mass index is 26.15 kg/m  as calculated from the following:    Height as of this encounter: 1.683 m (5' 6.25\").    Weight as of this encounter: 74 kg (163 lb 4 oz).         She reports that she has never " smoked. She has never used smokeless tobacco.      Appropriate preventive services were discussed with this patient, including applicable screening as appropriate for fall prevention, nutrition, physical activity, Tobacco-use cessation, weight loss and cognition.  Checklist reviewing preventive services available has been given to the patient.    Reviewed patients plan of care and provided an AVS. The Basic Care Plan (routine screening as documented in Health Maintenance) for Seema meets the Care Plan requirement. This Care Plan has been established and reviewed with the Patient.        Nat Houston MD  [facility]    Identified Health Risks:

## 2023-11-15 NOTE — PATIENT INSTRUCTIONS
Patient Education   Personalized Prevention Plan  You are due for the preventive services outlined below.  Your care team is available to assist you in scheduling these services.  If you have already completed any of these items, please share that information with your care team to update in your medical record.  Health Maintenance Due   Topic Date Due    Urine Test  Never done    RSV VACCINE (Pregnancy & 60+) (1 - 1-dose 60+ series) Never done    Kidney Microalbumin Urine Test  07/19/2020    Osteoporosis Screening  09/05/2021    Basic Metabolic Panel  11/04/2023    Cholesterol Lab  11/04/2023    ANNUAL REVIEW OF HM ORDERS  11/04/2023    Hemoglobin  11/04/2023       What is Chronic Kidney Disease  Chronic kidney disease (CKD) is the permanent loss of kidney function.  When the kidneys are damaged, they do not remove wastes and extra water from the blood as well as they should.  The most common causes of CKD are high blood pressure and diabetes.   It can also run in families, so you may be at higher risk if you have a blood relative with kidney failure.  CKD is a silent condition (having few or no symptoms) and develops so slowly that most people do not realize they are sick until the disease is advanced.  Left undetected and untreated CKD can eventually lead to further problems including hypertension, anemia, weak bones, poor nutrition, nerve damage, and in severe cases kidney failure (requiring dialysis or transplant).  CKD also increases a patient s risk for developing diseases of the heart and blood vessels.    We can diagnose CKD through blood and urine tests.  By detecting CKD in its early stages we can prevent or delay the complications of CKD, including kidney failure and heart disease.  Stages of CKD:  There are five stages of chronic kidney disease.  Your stage of kidney damage is based on the presence of kidney damage (often in the form of urinary proteins) and your glomerular filtration rate (GFR), which  is a measure of your level of kidney function.  Things you can do to slow down or avoid kidney failure:  If you have diabetes, control your blood sugar.  Studies show that keeping tight control of blood sugar can delay or prevent kidney failure  If you have high blood pressure, it is important to lower your blood pressure.  Medications called ACE (angiotensin-converting enzyme) inhibitors or ARBs (angiotensin receptor blockers) are used to keep your kidney disease from getting worse.  Be active by including exercise in your daily routine.  Eat a well balanced diet.   We may have you watch the amount of protein you eat.  Too much protein can make the kidneys work too hard.  Quit smoking.  Smoking damages the kidneys.  It also raises blood pressure.  Discuss all of your medications, including over-the-counter medications, with your doctor.  You should  avoid certain medications, including popular medications such as Advil, Motrin, Aleve (and other  NSAIDs ) which can further damage your kidneys.  For more information on Chronic Kidney Disease, please see the National Kidney Foundation www.kidney.org.

## 2023-11-15 NOTE — RESULT ENCOUNTER NOTE
Seema,    These labs are normal or acceptable.    Please contact my office if you have questions.    Nat Houston M.D.

## 2024-09-27 ENCOUNTER — OFFICE VISIT (OUTPATIENT)
Dept: DERMATOLOGY | Facility: CLINIC | Age: 79
End: 2024-09-27
Payer: COMMERCIAL

## 2024-09-27 DIAGNOSIS — D18.01 CHERRY ANGIOMA: ICD-10-CM

## 2024-09-27 DIAGNOSIS — Z85.828 HISTORY OF SKIN CANCER: Primary | ICD-10-CM

## 2024-09-27 DIAGNOSIS — L82.0 INFLAMED SEBORRHEIC KERATOSIS: ICD-10-CM

## 2024-09-27 DIAGNOSIS — L81.4 LENTIGO: ICD-10-CM

## 2024-09-27 DIAGNOSIS — D48.5 NEOPLASM OF UNCERTAIN BEHAVIOR OF SKIN: ICD-10-CM

## 2024-09-27 DIAGNOSIS — L82.1 SEBORRHEIC KERATOSIS: ICD-10-CM

## 2024-09-27 PROCEDURE — 17110 DESTRUCTION B9 LES UP TO 14: CPT | Performed by: PHYSICIAN ASSISTANT

## 2024-09-27 PROCEDURE — 11102 TANGNTL BX SKIN SINGLE LES: CPT | Mod: 59 | Performed by: PHYSICIAN ASSISTANT

## 2024-09-27 PROCEDURE — 88305 TISSUE EXAM BY PATHOLOGIST: CPT | Performed by: PATHOLOGY

## 2024-09-27 PROCEDURE — 99213 OFFICE O/P EST LOW 20 MIN: CPT | Mod: 25 | Performed by: PHYSICIAN ASSISTANT

## 2024-09-27 ASSESSMENT — PAIN SCALES - GENERAL: PAINLEVEL: NO PAIN (0)

## 2024-09-27 NOTE — PATIENT INSTRUCTIONS
Wound Care Instructions     FOR SUPERFICIAL WOUNDS     [facility] [phone]    [facility] [phone]    AFTER 24 HOURS YOU SHOULD REMOVE THE BANDAGE AND BEGIN DAILY DRESSING CHANGES AS FOLLOWS:     1) Remove Dressing.     2) Clean and dry the area with tap water using a Q-tip or sterile gauze pad.     3) Apply Vaseline, Aquaphor, Polysporin ointment or Bacitracin ointment over entire wound.  Do NOT use Neosporin ointment.     4) Cover the wound with a band-aid, or a sterile non-stick gauze pad and micropore paper tape      REPEAT THESE INSTRUCTIONS AT LEAST ONCE A DAY UNTIL THE WOUND HAS COMPLETELY HEALED.    It is an old wives tale that a wound heals better when it is exposed to air and allowed to dry out. The wound will heal faster with a better cosmetic result if it is kept moist with ointment and covered with a bandage.    **Do not let the wound dry out.**      Supplies Needed:      *Cotton tipped applicators (Q-tips)    *Polysporin Ointment or Bacitracin Ointment (NOT NEOSPORIN)    *Band-aids or non-stick gauze pads and micropore paper tape.      PATIENT INFORMATION:    During the healing process you will notice a number of changes. All wounds develop a small halo of redness surrounding the wound.  This means healing is occurring. Severe itching with extensive redness usually indicates sensitivity to the ointment or bandage tape used to dress the wound.  You should call our office if this develops.      Swelling  and/or discoloration around your surgical site is common, particularly when performed around the eye.    All wounds normally drain.  The larger the wound the more drainage there will be.  After 7-10 days, you will notice the wound beginning to shrink and new skin will begin to grow.  The wound is healed when you can see skin has formed over the entire area.  A healed wound has a healthy, shiny look to the surface and is red to dark pink in color to normalize.  Wounds may  take approximately 4-6 weeks to heal.  Larger wounds may take 6-8 weeks.  After the wound is healed you may discontinue dressing changes.    You may experience a sensation of tightness as your wound heals. This is normal and will gradually subside.    Your healed wound may be sensitive to temperature changes. This sensitivity improves with time, but if you re having a lot of discomfort, try to avoid temperature extremes.    Patients frequently experience itching after their wound appears to have healed because of the continue healing under the skin.  Plain Vaseline will help relieve the itching.        POSSIBLE COMPLICATIONS    BLEEDING:    Leave the bandage in place.  Use tightly rolled up gauze or a cloth to apply direct pressure over the bandage for 30  minutes.  Reapply pressure for an additional 30 minutes if necessary  Use additional gauze and tape to maintain pressure once the bleeding has stopped.   WOUND CARE INSTRUCTIONS   FOR CRYOSURGERY   This area treated with liquid nitrogen should form a blister (areas treated may or may not blister-skin may just turn dark and slough off). You do not need to bandage the area unless a blister forms and breaks (which may be a few days). When the blister breaks, begin daily dressing changes as follows:  1) Clean and dry the area with tap water using clean Q-tip or sterile gauze pad.   2) Apply Polysporin ointment or Bacitracin ointment over entire wound. Do NOT use Neosporin ointment.   3) Cover the wound with a band-aid or sterile non-stick gauze pad and micropore paper tape.   REPEAT THESE INSTRUCTIONS AT LEAST ONCE A DAY UNTIL THE WOUND HAS COMPLETELY HEALED.   It is an old wives tale that a wound heals better when it is exposed to air and allowed to dry out. The wound will heal faster with a better cosmetic result if it is kept moist with ointment and covered with a bandage.   Do not let the wound dry out.   IMPORTANT INFORMATION ON REVERSE SIDE   Supplies Needed:    *Cotton tipped applicators (Q-tips)   *Polysporin ointment or Bacitracin ointment (NOT NEOSPORIN)   *Band-aids, or non stick gauze pads and micropore paper tape   PATIENT INFORMATION   During the healing process you will notice a number of changes. All wounds develop a small halo of redness surrounding the wound. This means healing is occurring. Severe itching with extensive redness usually indicates sensitivity to the ointment or bandage tape used to dress the wound. You should call our office if this develops.   Swelling and/or discoloration around your surgical site is common, particularly when performed around the eye.   All wounds normally drain. The larger the wound the more drainage there will be. After 7-10 days, you will notice the wound beginning to shrink and new skin will begin to grow. The wound is healed when you can see skin has formed over the entire area. A healed wound has a healthy, shiny look to the surface and is red to dark pink in color to normalize. Wounds may take approximately 4-6 weeks to heal. Larger wounds may take 6-8 weeks. After the wound is healed you may discontinue dressing changes.   You may experience a sensation of tightness as your wound heals. This is normal and will gradually subside.   Your healed wound may be sensitive to temperature changes. This sensitivity improves with time, but if you re having a lot of discomfort, try to avoid temperature extremes.   Patients frequently experience itching after their wound appears to have healed because of the continue healing under the skin. Plain Vaseline will help relieve the itching.

## 2024-09-27 NOTE — PROGRESS NOTES
Seema Barnes is a pleasant 79 year old year old female patient here today for skin check. Spot on right thigh, feels rough. Present for months. Patient denies any painful or bleeding skin lesions. No changing nevi.  Remainder of the HPI, Meds, PMH, Allergies, FH, and SH was reviewed in chart.    Pertinent Hx:   History of NMSC  Past Medical History:   Diagnosis Date    MEDICAL HISTORY OF -     migraines - none since 1995    Pure hypercholesterolemia     Squamous cell carcinoma of skin, unspecified     Unspecified essential hypertension        Past Surgical History:   Procedure Laterality Date    HC REMOVAL OF TONSILS,<11 Y/O      Tonsils <10y.o.    SURGICAL HISTORY OF -       tubal ligation        Family History   Problem Relation Age of Onset    C.A.D. Mother 50        MI-58    Hypertension Mother     C.A.NYDIA. Maternal Aunt     C.A.NYDIA. Maternal Uncle     C.A.NYDIA. Sister         angioplasty and stint-age 59    Hypertension Sister     Hypertension Brother     Diabetes No family hx of     Cerebrovascular Disease No family hx of     Breast Cancer No family hx of     Cancer - colorectal No family hx of        Social History     Socioeconomic History    Marital status:      Spouse name: Not on file    Number of children: Not on file    Years of education: Not on file    Highest education level: Not on file   Occupational History    Not on file   Tobacco Use    Smoking status: Never    Smokeless tobacco: Never   Vaping Use    Vaping status: Never Used   Substance and Sexual Activity    Alcohol use: No     Alcohol/week: 0.0 standard drinks of alcohol     Comment: One glass wine every 2-3 months    Drug use: No    Sexual activity: Not Currently     Partners: Male     Comment:    Other Topics Concern    Parent/sibling w/ CABG, MI or angioplasty before 65F 55M? Yes   Social History Narrative    Dairy/d 3 servings/d. plus a calcium supplement    Caffeine 2-3 servings/d    Exercise 6 x week    Sunscreen used -  Not as often as she should    Seatbelts used - Yes    Working smoke/CO detectors in the home - Yes    Guns stored in the home - No    Self Breast Exams - Yes,occ    Self Testicular Exam - NA    Eye Exam up to date - Yes    Dental Exam up to date - Yes    Pap Smear up to date - No    Mammogram up to date - Had appt today    PSA up to date - NA    Dexa Scan up to date - Yes-Sept 2006    Flex Sig / Colonoscopy up to date -Yes-Nov 2007,repeat in 10yrs per pt    Immunizations up to date - yes-td 2007    Abuse: Current or Past(Physical, Sexual or Emotional)- No    Do you feel safe in your environment - Yes     Social Determinants of Health     Financial Resource Strain: Low Risk  (11/12/2023)    Financial Resource Strain     Within the past 12 months, have you or your family members you live with been unable to get utilities (heat, electricity) when it was really needed?: No   Food Insecurity: Low Risk  (11/12/2023)    Food Insecurity     Within the past 12 months, did you worry that your food would run out before you got money to buy more?: No     Within the past 12 months, did the food you bought just not last and you didn t have money to get more?: No   Transportation Needs: Low Risk  (11/12/2023)    Transportation Needs     Within the past 12 months, has lack of transportation kept you from medical appointments, getting your medicines, non-medical meetings or appointments, work, or from getting things that you need?: No   Physical Activity: Not on file   Stress: Not on file   Social Connections: Not on file   Interpersonal Safety: Low Risk  (11/15/2023)    Interpersonal Safety     Do you feel physically and emotionally safe where you currently live?: Yes     Within the past 12 months, have you been hit, slapped, kicked or otherwise physically hurt by someone?: No     Within the past 12 months, have you been humiliated or emotionally abused in other ways by your partner or ex-partner?: No   Housing Stability: Low Risk   (11/12/2023)    Housing Stability     Do you have housing? : Yes     Are you worried about losing your housing?: No       Outpatient Encounter Medications as of 9/27/2024   Medication Sig Dispense Refill    EPINEPHrine (ANY BX GENERIC EQUIV) 0.3 MG/0.3ML injection 2-pack Inject 0.3 mLs (0.3 mg) into the muscle as needed for anaphylaxis May repeat one time in 5-15 minutes if response to initial dose is inadequate. 2 each 1    metoprolol succinate ER (TOPROL XL) 25 MG 24 hr tablet Take 0.5 tablets (12.5 mg) by mouth daily 45 tablet 3    simvastatin (ZOCOR) 20 MG tablet Take 1 tablet (20 mg) by mouth at bedtime 90 tablet 3     No facility-administered encounter medications on file as of 9/27/2024.             O:   NAD, WDWN, Alert & Oriented, Mood & Affect wnl, Vitals stable   Here today alone   There were no vitals taken for this visit.   General appearance normal   Vitals stable   Alert, oriented and in no acute distress     0.6 cm pinkish brown scaly papule on right posterior thigh   Brown stuck on papule on right jaw and right lateral neck   Stuck on papules and brown macules on trunk and ext   Red papules on trunk    The remainder of skin exam is normal     Eyes: Conjunctivae/lids:Normal     ENT: Lips: normal    MSK:Normal    Cardiovascular: peripheral edema none    Pulm: Breathing Normal    Neuro/Psych: Orientation:Alert and Orientedx3 ; Mood/Affect:normal   A/P:  R/O ISK vs SCC on right posterior thigh  TANGENTIAL BIOPSY SENT OUT:  After consent, anesthesia with LEC and prep, tangential excision performed and specimen sent out for permanent section histology.  No complications and routine wound care. Patient told to call our office in 1-2 weeks for result.      2. Inflamed seborrheic keratosis on right jaw and right neck x 2  LN2:  Treated with LN2 for 5s for 1-2 cycles. Warned risks of blistering, pain, pigment change, scarring, and incomplete resolution.  Advised patient to return if lesions do not completely  resolve.  Wound care sheet given.    3. Seborrheic keratosis, lentigo, angioma, history of NMSC  It was a pleasure speaking to Seema Barnes today.  BENIGN LESIONS DISCUSSED WITH PATIENT:  I discussed the specifics of tumor, prognosis, and genetics of benign lesions.  I explained that treatment of these lesions would be purely cosmetic and not medically neccessary.  I discussed with patient different removal options including excision, cautery and /or laser.      Nature and genetics of benign skin lesions dicussed with patient.  Signs and Symptoms of skin cancer discussed with patient.  ABCDEs of melanoma reviewed with patient.  Patient encouraged to perform monthly skin exams.  UV precautions reviewed with patient.  Risks of non-melanoma skin cancer discussed with patient   Return to clinic pending biopsy results.

## 2024-09-27 NOTE — LETTER
9/27/2024      Seema Barnes  [address] [facility] [address]      Dear Colleague,    Thank you for referring your patient, Seema Barnes, to the [facility]. Please see a copy of my visit note below.    Seema Barnes is a pleasant 79 year old year old female patient here today for skin check. Spot on right thigh, feels rough. Present for months. Patient denies any painful or bleeding skin lesions. No changing nevi.  Remainder of the HPI, Meds, PMH, Allergies, FH, and SH was reviewed in chart.    Pertinent Hx:   History of NMSC  Past Medical History:   Diagnosis Date     MEDICAL HISTORY OF -     migraines - none since 1995     Pure hypercholesterolemia      Squamous cell carcinoma of skin, unspecified      Unspecified essential hypertension        Past Surgical History:   Procedure Laterality Date     HC REMOVAL OF TONSILS,<9 Y/O      Tonsils <10y.o.     SURGICAL HISTORY OF -       tubal ligation        Family History   Problem Relation Age of Onset     C.A.D. Mother 50        MI-58     Hypertension Mother      C.A.D. Maternal Aunt      C.A.D. Maternal Uncle      C.A.D. Sister         angioplasty and stint-age 59     Hypertension Sister      Hypertension Brother      Diabetes No family hx of      Cerebrovascular Disease No family hx of      Breast Cancer No family hx of      Cancer - colorectal No family hx of        Social History     Socioeconomic History     Marital status:      Spouse name: Not on file     Number of children: Not on file     Years of education: Not on file     Highest education level: Not on file   Occupational History     Not on file   Tobacco Use     Smoking status: Never     Smokeless tobacco: Never   Vaping Use     Vaping status: Never Used   Substance and Sexual Activity     Alcohol use: No     Alcohol/week: 0.0 standard drinks of alcohol     Comment: One glass wine every 2-3 months     Drug use: No     Sexual activity: Not Currently      Partners: Male     Comment:    Other Topics Concern     Parent/sibling w/ CABG, MI or angioplasty before 65F 55M? Yes   Social History Narrative    Dairy/d 3 servings/d. plus a calcium supplement    Caffeine 2-3 servings/d    Exercise 6 x week    Sunscreen used - Not as often as she should    Seatbelts used - Yes    Working smoke/CO detectors in the home - Yes    Guns stored in the home - No    Self Breast Exams - Yes,occ    Self Testicular Exam - NA    Eye Exam up to date - Yes    Dental Exam up to date - Yes    Pap Smear up to date - No    Mammogram up to date - Had appt today    PSA up to date - NA    Dexa Scan up to date - Yes-Sept 2006    Flex Sig / Colonoscopy up to date -Yes-Nov 2007,repeat in 10yrs per pt    Immunizations up to date - yes-td 2007    Abuse: Current or Past(Physical, Sexual or Emotional)- No    Do you feel safe in your environment - Yes     Social Determinants of Health     Financial Resource Strain: Low Risk  (11/12/2023)    Financial Resource Strain      Within the past 12 months, have you or your family members you live with been unable to get utilities (heat, electricity) when it was really needed?: No   Food Insecurity: Low Risk  (11/12/2023)    Food Insecurity      Within the past 12 months, did you worry that your food would run out before you got money to buy more?: No      Within the past 12 months, did the food you bought just not last and you didn t have money to get more?: No   Transportation Needs: Low Risk  (11/12/2023)    Transportation Needs      Within the past 12 months, has lack of transportation kept you from medical appointments, getting your medicines, non-medical meetings or appointments, work, or from getting things that you need?: No   Physical Activity: Not on file   Stress: Not on file   Social Connections: Not on file   Interpersonal Safety: Low Risk  (11/15/2023)    Interpersonal Safety      Do you feel physically and emotionally safe where you currently  live?: Yes      Within the past 12 months, have you been hit, slapped, kicked or otherwise physically hurt by someone?: No      Within the past 12 months, have you been humiliated or emotionally abused in other ways by your partner or ex-partner?: No   Housing Stability: Low Risk  (11/12/2023)    Housing Stability      Do you have housing? : Yes      Are you worried about losing your housing?: No       Outpatient Encounter Medications as of 9/27/2024   Medication Sig Dispense Refill     EPINEPHrine (ANY BX GENERIC EQUIV) 0.3 MG/0.3ML injection 2-pack Inject 0.3 mLs (0.3 mg) into the muscle as needed for anaphylaxis May repeat one time in 5-15 minutes if response to initial dose is inadequate. 2 each 1     metoprolol succinate ER (TOPROL XL) 25 MG 24 hr tablet Take 0.5 tablets (12.5 mg) by mouth daily 45 tablet 3     simvastatin (ZOCOR) 20 MG tablet Take 1 tablet (20 mg) by mouth at bedtime 90 tablet 3     No facility-administered encounter medications on file as of 9/27/2024.             O:   NAD, WDWN, Alert & Oriented, Mood & Affect wnl, Vitals stable   Here today alone   There were no vitals taken for this visit.   General appearance normal   Vitals stable   Alert, oriented and in no acute distress     0.6 cm pinkish brown scaly papule on right posterior thigh   Brown stuck on papule on right jaw and right lateral neck   Stuck on papules and brown macules on trunk and ext   Red papules on trunk    The remainder of skin exam is normal     Eyes: Conjunctivae/lids:Normal     ENT: Lips: normal    MSK:Normal    Cardiovascular: peripheral edema none    Pulm: Breathing Normal    Neuro/Psych: Orientation:Alert and Orientedx3 ; Mood/Affect:normal   A/P:  R/O ISK vs SCC on right posterior thigh  TANGENTIAL BIOPSY SENT OUT:  After consent, anesthesia with LEC and prep, tangential excision performed and specimen sent out for permanent section histology.  No complications and routine wound care. Patient told to call our  office in 1-2 weeks for result.      2. Inflamed seborrheic keratosis on right jaw and right neck x 2  LN2:  Treated with LN2 for 5s for 1-2 cycles. Warned risks of blistering, pain, pigment change, scarring, and incomplete resolution.  Advised patient to return if lesions do not completely resolve.  Wound care sheet given.    3. Seborrheic keratosis, lentigo, angioma, history of NMSC  It was a pleasure speaking to Seema Barnes today.  BENIGN LESIONS DISCUSSED WITH PATIENT:  I discussed the specifics of tumor, prognosis, and genetics of benign lesions.  I explained that treatment of these lesions would be purely cosmetic and not medically neccessary.  I discussed with patient different removal options including excision, cautery and /or laser.      Nature and genetics of benign skin lesions dicussed with patient.  Signs and Symptoms of skin cancer discussed with patient.  ABCDEs of melanoma reviewed with patient.  Patient encouraged to perform monthly skin exams.  UV precautions reviewed with patient.  Risks of non-melanoma skin cancer discussed with patient   Return to clinic pending biopsy results.       Again, thank you for allowing me to participate in the care of your patient.        Sincerely,        Kylie Alcantara PA-C

## 2024-10-02 LAB
PATH REPORT.COMMENTS IMP SPEC: NORMAL
PATH REPORT.FINAL DX SPEC: NORMAL
PATH REPORT.GROSS SPEC: NORMAL
PATH REPORT.MICROSCOPIC SPEC OTHER STN: NORMAL
PATH REPORT.MICROSCOPIC SPEC OTHER STN: NORMAL
PATH REPORT.RELEVANT HX SPEC: NORMAL

## 2024-11-13 SDOH — HEALTH STABILITY: PHYSICAL HEALTH: ON AVERAGE, HOW MANY MINUTES DO YOU ENGAGE IN EXERCISE AT THIS LEVEL?: 50 MIN

## 2024-11-13 SDOH — HEALTH STABILITY: PHYSICAL HEALTH: ON AVERAGE, HOW MANY DAYS PER WEEK DO YOU ENGAGE IN MODERATE TO STRENUOUS EXERCISE (LIKE A BRISK WALK)?: 3 DAYS

## 2024-11-13 ASSESSMENT — SOCIAL DETERMINANTS OF HEALTH (SDOH): HOW OFTEN DO YOU GET TOGETHER WITH FRIENDS OR RELATIVES?: ONCE A WEEK

## 2024-11-18 ENCOUNTER — MYC MEDICAL ADVICE (OUTPATIENT)
Dept: FAMILY MEDICINE | Facility: CLINIC | Age: 79
End: 2024-11-18

## 2024-11-18 ENCOUNTER — OFFICE VISIT (OUTPATIENT)
Dept: FAMILY MEDICINE | Facility: CLINIC | Age: 79
End: 2024-11-18
Payer: COMMERCIAL

## 2024-11-18 VITALS
TEMPERATURE: 97.5 F | OXYGEN SATURATION: 97 % | HEIGHT: 66 IN | DIASTOLIC BLOOD PRESSURE: 80 MMHG | WEIGHT: 177.38 LBS | HEART RATE: 64 BPM | BODY MASS INDEX: 28.51 KG/M2 | SYSTOLIC BLOOD PRESSURE: 132 MMHG

## 2024-11-18 DIAGNOSIS — N18.30 STAGE 3 CHRONIC KIDNEY DISEASE, UNSPECIFIED WHETHER STAGE 3A OR 3B CKD (H): ICD-10-CM

## 2024-11-18 DIAGNOSIS — Z91.030 BEE ALLERGY STATUS: ICD-10-CM

## 2024-11-18 DIAGNOSIS — E78.5 HYPERLIPIDEMIA LDL GOAL <100: ICD-10-CM

## 2024-11-18 DIAGNOSIS — Z00.00 ENCOUNTER FOR MEDICARE ANNUAL WELLNESS EXAM: Primary | ICD-10-CM

## 2024-11-18 DIAGNOSIS — I10 HYPERTENSION GOAL BP (BLOOD PRESSURE) < 130/80: ICD-10-CM

## 2024-11-18 LAB
ANION GAP SERPL CALCULATED.3IONS-SCNC: 12 MMOL/L (ref 7–15)
BUN SERPL-MCNC: 21.7 MG/DL (ref 8–23)
CALCIUM SERPL-MCNC: 10.2 MG/DL (ref 8.8–10.4)
CHLORIDE SERPL-SCNC: 105 MMOL/L (ref 98–107)
CHOLEST SERPL-MCNC: 173 MG/DL
CREAT SERPL-MCNC: 0.93 MG/DL (ref 0.51–0.95)
EGFRCR SERPLBLD CKD-EPI 2021: 61 ML/MIN/1.73M2
FASTING STATUS PATIENT QL REPORTED: NO
FASTING STATUS PATIENT QL REPORTED: NO
GLUCOSE SERPL-MCNC: 99 MG/DL (ref 70–99)
HCO3 SERPL-SCNC: 25 MMOL/L (ref 22–29)
HDLC SERPL-MCNC: 40 MG/DL
HGB BLD-MCNC: 14.2 G/DL (ref 11.7–15.7)
LDLC SERPL CALC-MCNC: 97 MG/DL
NONHDLC SERPL-MCNC: 133 MG/DL
POTASSIUM SERPL-SCNC: 4.4 MMOL/L (ref 3.4–5.3)
SODIUM SERPL-SCNC: 142 MMOL/L (ref 135–145)
TRIGL SERPL-MCNC: 179 MG/DL

## 2024-11-18 PROCEDURE — 36415 COLL VENOUS BLD VENIPUNCTURE: CPT | Performed by: FAMILY MEDICINE

## 2024-11-18 PROCEDURE — 99214 OFFICE O/P EST MOD 30 MIN: CPT | Mod: 25 | Performed by: FAMILY MEDICINE

## 2024-11-18 PROCEDURE — G0439 PPPS, SUBSEQ VISIT: HCPCS | Performed by: FAMILY MEDICINE

## 2024-11-18 PROCEDURE — 80061 LIPID PANEL: CPT | Performed by: FAMILY MEDICINE

## 2024-11-18 PROCEDURE — 85018 HEMOGLOBIN: CPT | Performed by: FAMILY MEDICINE

## 2024-11-18 PROCEDURE — 80048 BASIC METABOLIC PNL TOTAL CA: CPT | Performed by: FAMILY MEDICINE

## 2024-11-18 RX ORDER — METOPROLOL SUCCINATE 25 MG/1
12.5 TABLET, EXTENDED RELEASE ORAL DAILY
Qty: 45 TABLET | Refills: 3 | Status: SHIPPED | OUTPATIENT
Start: 2024-11-18

## 2024-11-18 RX ORDER — EPINEPHRINE 0.3 MG/.3ML
0.3 INJECTION SUBCUTANEOUS PRN
Qty: 2 EACH | Refills: 1 | Status: SHIPPED | OUTPATIENT
Start: 2024-11-18

## 2024-11-18 RX ORDER — SIMVASTATIN 20 MG
20 TABLET ORAL AT BEDTIME
Qty: 90 TABLET | Refills: 3 | Status: SHIPPED | OUTPATIENT
Start: 2024-11-18

## 2024-11-18 RX ORDER — SIMVASTATIN 20 MG
20 TABLET ORAL AT BEDTIME
Qty: 90 TABLET | Refills: 3 | Status: SHIPPED | OUTPATIENT
Start: 2024-11-18 | End: 2024-11-18

## 2024-11-18 RX ORDER — METOPROLOL SUCCINATE 25 MG/1
12.5 TABLET, EXTENDED RELEASE ORAL DAILY
Qty: 45 TABLET | Refills: 3 | Status: SHIPPED | OUTPATIENT
Start: 2024-11-18 | End: 2024-11-18

## 2024-11-18 ASSESSMENT — PAIN SCALES - GENERAL: PAINLEVEL_OUTOF10: NO PAIN (0)

## 2024-11-18 NOTE — PATIENT INSTRUCTIONS
Patient Education   Preventive Care Advice   This is general advice given by our system to help you stay healthy. However, your care team may have specific advice just for you. Please talk to your care team about your preventive care needs.  Nutrition  Eat 5 or more servings of fruits and vegetables each day.  Try wheat bread, brown rice and whole grain pasta (instead of white bread, rice, and pasta).  Get enough calcium and vitamin D. Check the label on foods and aim for 100% of the RDA (recommended daily allowance).  Lifestyle  Exercise at least 150 minutes each week  (30 minutes a day, 5 days a week).  Do muscle strengthening activities 2 days a week. These help control your weight and prevent disease.  No smoking.  Wear sunscreen to prevent skin cancer.  Have a dental exam and cleaning every 6 months.  Yearly exams  See your health care team every year to talk about:  Any changes in your health.  Any medicines your care team has prescribed.  Preventive care, family planning, and ways to prevent chronic diseases.  Shots (vaccines)   HPV shots (up to age 26), if you've never had them before.  Hepatitis B shots (up to age 59), if you've never had them before.  COVID-19 shot: Get this shot when it's due.  Flu shot: Get a flu shot every year.  Tetanus shot: Get a tetanus shot every 10 years.  Pneumococcal, hepatitis A, and RSV shots: Ask your care team if you need these based on your risk.  Shingles shot (for age 50 and up)  General health tests  Diabetes screening:  Starting at age 35, Get screened for diabetes at least every 3 years.  If you are younger than age 35, ask your care team if you should be screened for diabetes.  Cholesterol test: At age 39, start having a cholesterol test every 5 years, or more often if advised.  Bone density scan (DEXA): At age 50, ask your care team if you should have this scan for osteoporosis (brittle bones).  Hepatitis C: Get tested at least once in your life.  STIs (sexually  transmitted infections)  Before age 24: Ask your care team if you should be screened for STIs.  After age 24: Get screened for STIs if you're at risk. You are at risk for STIs (including HIV) if:  You are sexually active with more than one person.  You don't use condoms every time.  You or a partner was diagnosed with a sexually transmitted infection.  If you are at risk for HIV, ask about PrEP medicine to prevent HIV.  Get tested for HIV at least once in your life, whether you are at risk for HIV or not.  Cancer screening tests  Cervical cancer screening: If you have a cervix, begin getting regular cervical cancer screening tests starting at age 21.  Breast cancer scan (mammogram): If you've ever had breasts, begin having regular mammograms starting at age 40. This is a scan to check for breast cancer.  Colon cancer screening: It is important to start screening for colon cancer at age 45.  Have a colonoscopy test every 10 years (or more often if you're at risk) Or, ask your provider about stool tests like a FIT test every year or Cologuard test every 3 years.  To learn more about your testing options, visit:   .  For help making a decision, visit:   https://bit.ly/cr84637.  Prostate cancer screening test: If you have a prostate, ask your care team if a prostate cancer screening test (PSA) at age 55 is right for you.  Lung cancer screening: If you are a current or former smoker ages 50 to 80, ask your care team if ongoing lung cancer screenings are right for you.  For informational purposes only. Not to replace the advice of your health care provider. Copyright   2023 [facility]. All rights reserved. Clinically reviewed by the [facility]. [facility] [id number] - REV 01/24.  Hearing Loss: Care Instructions  Overview     Hearing loss is a sudden or slow decrease in how well you hear. It can range from slight to profound. Permanent hearing loss can occur with aging. It also can  happen when you are exposed long-term to loud noise. Examples include listening to loud music, riding motorcycles, or being around other loud machines.  Hearing loss can affect your work and home life. It can make you feel lonely or depressed. You may feel that you have lost your independence. But hearing aids and other devices can help you hear better and feel connected to others.  Follow-up care is a key part of your treatment and safety. Be sure to make and go to all appointments, and call your doctor if you are having problems. It's also a good idea to know your test results and keep a list of the medicines you take.  How can you care for yourself at home?  Avoid loud noises whenever possible. This helps keep your hearing from getting worse.  Always wear hearing protection around loud noises.  Wear a hearing aid as directed.  A professional can help you pick a hearing aid that will work best for you.  You can also get hearing aids over the counter for mild to moderate hearing loss.  Have hearing tests as your doctor suggests. They can show whether your hearing has changed. Your hearing aid may need to be adjusted.  Use other devices as needed. These may include:  Telephone amplifiers and hearing aids that can connect to a television, stereo, radio, or microphone.  Devices that use lights or vibrations. These alert you to the doorbell, a ringing telephone, or a baby monitor.  Television closed-captioning. This shows the words at the bottom of the screen. Most new TVs can do this.  TTY (text telephone). This lets you type messages back and forth on the telephone instead of talking or listening. These devices are also called TDD. When messages are typed on the keyboard, they are sent over the phone line to a receiving TTY. The message is shown on a monitor.  Use text messaging, social media, and email if it is hard for you to communicate by telephone.  Try to learn a listening technique called speechreading. It is  "not lipreading. You pay attention to people's gestures, expressions, posture, and tone of voice. These clues can help you understand what a person is saying. Face the person you are talking to, and have them face you. Make sure the lighting is good. You need to see the other person's face clearly.  Think about counseling if you need help to adjust to your hearing loss.  When should you call for help?  Watch closely for changes in your health, and be sure to contact your doctor if:    You think your hearing is getting worse.     You have new symptoms, such as dizziness or nausea.   Where can you learn more?  Go to https://www.[employer].net/patiented  Enter R798 in the search box to learn more about \"Hearing Loss: Care Instructions.\"  Current as of: September 27, 2023  Content Version: 14.2 2024 [employer].   Care instructions adapted under license by your healthcare professional. If you have questions about a medical condition or this instruction, always ask your healthcare professional. [employer] disclaims any warranty or liability for your use of this information.    Bladder Training: Care Instructions  Your Care Instructions     Bladder training is used to treat urge incontinence and stress incontinence. Urge incontinence means that the need to urinate comes on so fast that you can't get to a toilet in time. Stress incontinence means that you leak urine because of pressure on your bladder. For example, it may happen when you laugh, cough, or lift something heavy.  Bladder training can increase how long you can wait before you have to urinate. It can also help your bladder hold more urine. And it can give you better control over the urge to urinate.  It is important to remember that bladder training takes a few weeks to a few months to make a difference. You may not see results right away, but don't give up.  Follow-up care is a key part of your treatment and safety. Be sure to make " and go to all appointments, and call your doctor if you are having problems. It's also a good idea to know your test results and keep a list of the medicines you take.  How can you care for yourself at home?  Work with your doctor to come up with a bladder training program that is right for you. You may use one or more of the following methods.  Delayed urination  In the beginning, try to keep from urinating for 5 minutes after you first feel the need to go.  While you wait, take deep, slow breaths to relax. Kegel exercises can also help you delay the need to go to the bathroom.  After some practice, when you can easily wait 5 minutes to urinate, try to wait 10 minutes before you urinate.  Slowly increase the waiting period until you are able to control when you have to urinate.  Scheduled urination  Empty your bladder when you first wake up in the morning.  Schedule times throughout the day when you will urinate.  Start by going to the bathroom every hour, even if you don't need to go.  Slowly increase the time between trips to the bathroom.  When you have found a schedule that works well for you, keep doing it.  If you wake up during the night and have to urinate, do it. Apply your schedule to waking hours only.  Kegel exercises  These tighten and strengthen pelvic muscles, which can help you control the flow of urine. (If doing these exercises causes pain, stop doing them and talk with your doctor.) To do Kegel exercises:  Squeeze your muscles as if you were trying not to pass gas. Or squeeze your muscles as if you were stopping the flow of urine. Your belly, legs, and buttocks shouldn't move.  Hold the squeeze for 3 seconds, then relax for 5 to 10 seconds.  Start with 3 seconds, then add 1 second each week until you are able to squeeze for 10 seconds.  Repeat the exercise 10 times a session. Do 3 to 8 sessions a day.  When should you call for help?  Watch closely for changes in your health, and be sure to  "contact your doctor if:    Your incontinence is getting worse.     You do not get better as expected.   Where can you learn more?  Go to https://www.[email or web address].net/patiented  Enter V684 in the search box to learn more about \"Bladder Training: Care Instructions.\"  Current as of: November 15, 2023  Content Version: 14.2 2024 [facility] [employer].   Care instructions adapted under license by your healthcare professional. If you have questions about a medical condition or this instruction, always ask your healthcare professional. Healthwise[employer] Inc[employer] disclaims any warranty or liability for your use of this information.       "

## 2024-11-18 NOTE — PROGRESS NOTES
"Preventive Care Visit  [facility]  Nat Houston MD, Family Medicine  Nov 18, 2024      Assessment & Plan     Encounter for Medicare annual wellness exam     Stage 3 chronic kidney disease, unspecified whether stage 3a or 3b CKD (H)  Continue monitoring  - BASIC METABOLIC PANEL; Future  - Albumin Random Urine Quantitative with Creat Ratio; Future  - Hemoglobin; Future  - UA Macroscopic with reflex to Microscopic and Culture; Future      Hyperlipidemia LDL goal <100  Continue simvastatin  - simvastatin (ZOCOR) 20 MG tablet; Take 1 tablet (20 mg) by mouth at bedtime.    Hypertension goal BP (blood pressure) < 130/80  Well controlled  - metoprolol succinate ER (TOPROL XL) 25 MG 24 hr tablet; Take 0.5 tablets (12.5 mg) by mouth daily.    Patient has been advised of split billing requirements and indicates understanding: Yes        BMI  Estimated body mass index is 28.41 kg/m  as calculated from the following:    Height as of this encounter: 1.683 m (5' 6.25\").    Weight as of this encounter: 80.5 kg (177 lb 6 oz).       Counseling  Appropriate preventive services were addressed with this patient via screening, questionnaire, or discussion as appropriate for fall prevention, nutrition, physical activity, Tobacco-use cessation, social engagement, weight loss and cognition.  Checklist reviewing preventive services available has been given to the patient.  Reviewed patient's diet, addressing concerns and/or questions.   She is at risk for lack of exercise and has been provided with information to increase physical activity for the benefit of her well-being.   The patient was provided with written information regarding signs of hearing loss.   Information on urinary incontinence and treatment options given to patient.           Marcelino Stephenson is a 79 year old, presenting for the following:  Medicare Visit        11/18/2024     9:38 AM   Additional Questions   Roomed by aida doran CMA"   Accompanied by Self           HPI      Hyperlipidemia Follow-Up  Simvastatin 20mg qhs  Are you regularly taking any medication or supplement to lower your cholesterol?   Yes- statin  Are you having muscle aches or other side effects that you think could be caused by your cholesterol lowering medication?  No    Hypertension Follow-up  Metoprolol 12.5mg qd  Do you check your blood pressure regularly outside of the clinic? No   Are you following a low salt diet? No  Are your blood pressures ever more than 140 on the top number (systolic) OR more   than 90 on the bottom number (diastolic), for example 140/90? N/A    BP Readings from Last 6 Encounters:   11/18/24 132/80   11/15/23 128/70   08/09/23 112/72   08/08/23 113/64   07/26/23 121/79   07/12/23 138/82         Health Care Directive  Patient does not have a Health Care Directive: Advance Directive received and scanned. Click on Code in the patient header to view.      11/13/2024   General Health   How would you rate your overall physical health? Excellent   Feel stress (tense, anxious, or unable to sleep) Not at all            11/13/2024   Nutrition   Diet: Regular (no restrictions)            11/13/2024   Exercise   Days per week of moderate/strenous exercise 3 days   Average minutes spent exercising at this level 50 min            11/13/2024   Social Factors   Frequency of gathering with friends or relatives Once a week   Worry food won't last until get money to buy more No   Food not last or not have enough money for food? No   Do you have housing? (Housing is defined as stable permanent housing and does not include staying ouside in a car, in a tent, in an abandoned building, in an overnight shelter, or couch-surfing.) Yes   Are you worried about losing your housing? No   Lack of transportation? No   Unable to get utilities (heat,electricity)? No            11/13/2024   Fall Risk   Fallen 2 or more times in the past year? No     No    Trouble with walking or  balance? No     No        Patient-reported    Multiple values from one day are sorted in reverse-chronological order          11/13/2024   Activities of Daily Living- Home Safety   Needs help with the following daily activites None of the above   Safety concerns in the home None of the above            11/13/2024   Dental   Dentist two times every year? Yes            11/13/2024   Hearing Screening   Hearing concerns? (!) TROUBLE UNDESTANDING A SPEAKER IN A PUBLIC MEETING OR [employer].            11/13/2024   Driving Risk Screening   Patient/family members have concerns about driving No            11/13/2024   General Alertness/Fatigue Screening   Have you been more tired than usual lately? No            11/13/2024   Urinary Incontinence Screening   Bothered by leaking urine in past 6 months Yes            11/13/2024   TB Screening   Were you born outside of the [place]? No            Today's PHQ-2 Score:       11/18/2024     9:36 AM   PHQ-2 ( 1999 [employer])   Q1: Little interest or pleasure in doing things 0    Q2: Feeling down, depressed or hopeless 0    PHQ-2 Score 0    Q1: Little interest or pleasure in doing things Not at all   Q2: Feeling down, depressed or hopeless Not at all   PHQ-2 Score 0       Patient-reported           11/13/2024   Substance Use   Alcohol more than 3/day or more than 7/wk No   Do you have a current opioid prescription? No   How severe/bad is pain from 1 to 10? 2/10   Do you use any other substances recreationally? No        Social History     Tobacco Use    Smoking status: Never    Smokeless tobacco: Never   Vaping Use    Vaping status: Never Used   Substance Use Topics    Alcohol use: No     Alcohol/week: 0.0 standard drinks of alcohol     Comment: One glass wine every 2-3 months    Drug use: No                        Reviewed and updated as needed this visit by Provider                      Current providers sharing in care for this patient include:  Patient Care Team:  Nat Houston  "MD AGUSTÍN as PCP - General (Family Practice)  Nat Houston MD as Assigned PCP  Kylie Pemberton PA-C as Physician Assistant (Dermatology)  Abdoulaye Ho MD as Assigned Surgical Provider    The following health maintenance items are reviewed in Epic and correct as of today:  Health Maintenance   Topic Date Due    URINALYSIS  Never done    RSV VACCINE (1 - 1-dose 75+ series) Never done    DEXA  09/05/2021    BMP  11/15/2024    LIPID  11/15/2024    MICROALBUMIN  11/15/2024    ANNUAL REVIEW OF HM ORDERS  11/15/2024    HEMOGLOBIN  11/15/2024    MEDICARE ANNUAL WELLNESS VISIT  11/18/2025    FALL RISK ASSESSMENT  11/18/2025    ADVANCE CARE PLANNING  11/15/2028    DTAP/TDAP/TD IMMUNIZATION (3 - Td or Tdap) 07/19/2029    PHQ-2 (once per calendar year)  Completed    INFLUENZA VACCINE  Completed    Pneumococcal Vaccine: 65+ Years  Completed    ZOSTER IMMUNIZATION  Completed    COVID-19 Vaccine  Completed    HPV IMMUNIZATION  Aged Out    MENINGITIS IMMUNIZATION  Aged Out    RSV MONOCLONAL ANTIBODY  Aged Out    COLONOSCOPY  Discontinued         Review of Systems  Constitutional, HEENT, cardiovascular, pulmonary, gi and gu systems are negative, except as otherwise noted.    Intermittent quad pain, no weakness or giving out of the knees.    Occasionally will get a sharp pain in her hip, did fall once.             Objective    Exam  /80   Pulse 64   Temp 97.5  F (36.4  C) (Tympanic)   Ht 1.683 m (5' 6.25\")   Wt 80.5 kg (177 lb 6 oz)   SpO2 97%   BMI 28.41 kg/m     Estimated body mass index is 28.41 kg/m  as calculated from the following:    Height as of this encounter: 1.683 m (5' 6.25\").    Weight as of this encounter: 80.5 kg (177 lb 6 oz).    Physical Exam  GENERAL: alert and no distress  NECK: no adenopathy, no asymmetry, masses, or scars  RESP: lungs clear to auscultation - no rales, rhonchi or wheezes  CV: regular rate and rhythm, normal S1 S2, no S3 or S4, no murmur, click or rub, no peripheral " edema  ABDOMEN: soft, nontender, no hepatosplenomegaly, no masses and bowel sounds normal  MS: no gross musculoskeletal defects noted, no edema  NEURO: Normal strength and tone, mentation intact and speech normal  PSYCH: mentation appears normal, affect normal/bright         11/18/2024   Mini Cog   Clock Draw Score 2 Normal   3 Item Recall 3 objects recalled   Mini Cog Total Score 5                 Signed Electronically by: Nat Houston MD

## 2025-02-28 NOTE — DISCHARGE INSTRUCTIONS
Appt scheduled for 3/10/25.   Use Medication as directed    Patient informed to rest, warm compresses over sinuses as needed, stay hydrated, cool humidifier, salt water gargles, and nasal saline sprays as needed.  Lots of rest and fluids.  Tylenol or ibuprofen as needed.  Nasal saline sprays as needed    Return if any worsening symptoms or if not improving.    Go to Emergency Room if sx worsen or change, worst headache of life, visual changes, confusion, fevers > 104F occur.     Patient voiced understanding of instructions given.